# Patient Record
Sex: FEMALE | Race: WHITE | Employment: OTHER | URBAN - METROPOLITAN AREA
[De-identification: names, ages, dates, MRNs, and addresses within clinical notes are randomized per-mention and may not be internally consistent; named-entity substitution may affect disease eponyms.]

---

## 2017-07-19 ENCOUNTER — HOSPITAL ENCOUNTER (OUTPATIENT)
Dept: LAB | Age: 67
Discharge: HOME | End: 2017-07-19

## 2017-07-19 PROCEDURE — 88305 TISSUE EXAM BY PATHOLOGIST: CPT | Performed by: INTERNAL MEDICINE

## 2020-09-02 ENCOUNTER — HOSPITAL ENCOUNTER (OUTPATIENT)
Dept: LAB | Age: 70
Discharge: HOME | End: 2020-09-02

## 2020-09-02 PROCEDURE — 88305 TISSUE EXAM BY PATHOLOGIST: CPT

## 2022-01-25 ENCOUNTER — TRANSCRIBE ORDER (OUTPATIENT)
Dept: REGISTRATION | Age: 72
End: 2022-01-25

## 2022-01-25 DIAGNOSIS — K86.2 CYST OF PANCREAS: Primary | ICD-10-CM

## 2022-02-03 ENCOUNTER — HOSPITAL ENCOUNTER (OUTPATIENT)
Dept: MRI IMAGING | Age: 72
Discharge: HOME | End: 2022-02-03
Attending: INTERNAL MEDICINE
Payer: MEDICARE

## 2022-02-03 DIAGNOSIS — K86.2 CYST OF PANCREAS: ICD-10-CM

## 2022-02-03 PROCEDURE — 74011000258 HC RX REV CODE- 258: Performed by: INTERNAL MEDICINE

## 2022-02-03 PROCEDURE — 74011250636 HC RX REV CODE- 250/636: Performed by: INTERNAL MEDICINE

## 2022-02-03 PROCEDURE — 74183 MRI ABD W/O CNTR FLWD CNTR: CPT

## 2022-02-03 PROCEDURE — A9576 INJ PROHANCE MULTIPACK: HCPCS | Performed by: INTERNAL MEDICINE

## 2022-02-03 RX ORDER — SODIUM CHLORIDE 0.9 % (FLUSH) 0.9 %
10 SYRINGE (ML) INJECTION
Status: COMPLETED | OUTPATIENT
Start: 2022-02-03 | End: 2022-02-03

## 2022-02-03 RX ADMIN — GADOTERIDOL 15 ML: 279.3 INJECTION, SOLUTION INTRAVENOUS at 07:36

## 2022-02-03 RX ADMIN — SODIUM CHLORIDE 100 ML: 900 INJECTION, SOLUTION INTRAVENOUS at 07:36

## 2022-02-03 RX ADMIN — Medication 10 ML: at 07:36

## 2022-03-31 ENCOUNTER — HOSPITAL ENCOUNTER (OUTPATIENT)
Dept: LAB | Age: 72
Discharge: HOME | End: 2022-03-31

## 2022-03-31 PROCEDURE — 88305 TISSUE EXAM BY PATHOLOGIST: CPT

## 2022-03-31 PROCEDURE — 88312 SPECIAL STAINS GROUP 1: CPT

## 2023-03-07 ENCOUNTER — HOSPITAL ENCOUNTER (OUTPATIENT)
Dept: MRI IMAGING | Age: 73
Discharge: HOME | End: 2023-03-10
Payer: MEDICARE

## 2023-03-07 DIAGNOSIS — D49.0 NEOPLASM OF UNSPECIFIED BEHAVIOR OF DIGESTIVE SYSTEM: ICD-10-CM

## 2023-03-07 DIAGNOSIS — K75.81 NONALCOHOLIC STEATOHEPATITIS: ICD-10-CM

## 2023-03-07 PROCEDURE — 74181 MRI ABDOMEN W/O CONTRAST: CPT

## 2024-02-18 ENCOUNTER — APPOINTMENT (OUTPATIENT)
Dept: CT IMAGING | Age: 74
End: 2024-02-18
Payer: MEDICARE

## 2024-02-18 ENCOUNTER — HOSPITAL ENCOUNTER (EMERGENCY)
Age: 74
Discharge: HOME | End: 2024-02-18
Payer: MEDICARE

## 2024-02-18 VITALS
RESPIRATION RATE: 15 BRPM | SYSTOLIC BLOOD PRESSURE: 138 MMHG | OXYGEN SATURATION: 96 % | HEART RATE: 77 BPM | TEMPERATURE: 97.7 F | DIASTOLIC BLOOD PRESSURE: 59 MMHG

## 2024-02-18 DIAGNOSIS — R05.3 CHRONIC COUGH: Primary | ICD-10-CM

## 2024-02-18 LAB
ALBUMIN SERPL-MCNC: 2.5 G/DL (ref 3.2–4.6)
ALBUMIN/GLOB SERPL: 0.6 (ref 0.4–1.6)
ALP SERPL-CCNC: 129 U/L (ref 50–136)
ALT SERPL-CCNC: 38 U/L (ref 12–65)
ANION GAP SERPL CALC-SCNC: 6 MMOL/L (ref 2–11)
AST SERPL-CCNC: 101 U/L (ref 15–37)
BACTERIA URNS QL MICRO: NEGATIVE /HPF
BASOPHILS # BLD: 0 K/UL (ref 0–0.2)
BASOPHILS NFR BLD: 0 % (ref 0–2)
BILIRUB SERPL-MCNC: 0.9 MG/DL (ref 0.2–1.1)
BILIRUB UR QL: NEGATIVE
BUN SERPL-MCNC: 13 MG/DL (ref 8–23)
CALCIUM SERPL-MCNC: 8.9 MG/DL (ref 8.3–10.4)
CASTS URNS QL MICRO: NORMAL /LPF
CHLORIDE SERPL-SCNC: 107 MMOL/L (ref 103–113)
CO2 SERPL-SCNC: 28 MMOL/L (ref 21–32)
CREAT SERPL-MCNC: 1 MG/DL (ref 0.6–1)
DIFFERENTIAL METHOD BLD: ABNORMAL
EOSINOPHIL # BLD: 0 K/UL (ref 0–0.8)
EOSINOPHIL NFR BLD: 0 % (ref 0.5–7.8)
EPI CELLS #/AREA URNS HPF: NORMAL /HPF
ERYTHROCYTE [DISTWIDTH] IN BLOOD BY AUTOMATED COUNT: 16.9 % (ref 11.9–14.6)
GLOBULIN SER CALC-MCNC: 4.5 G/DL (ref 2.8–4.5)
GLUCOSE BLD STRIP.AUTO-MCNC: 105 MG/DL (ref 65–100)
GLUCOSE SERPL-MCNC: 124 MG/DL (ref 65–100)
GLUCOSE UR QL STRIP.AUTO: NEGATIVE MG/DL
HCT VFR BLD AUTO: 35.5 % (ref 35.8–46.3)
HGB BLD-MCNC: 11.7 G/DL (ref 11.7–15.4)
IMM GRANULOCYTES # BLD AUTO: 0 K/UL (ref 0–0.5)
IMM GRANULOCYTES NFR BLD AUTO: 0 % (ref 0–5)
KETONES UR-MCNC: ABNORMAL MG/DL
LEUKOCYTE ESTERASE UR QL STRIP: NEGATIVE
LYMPHOCYTES # BLD: 1.3 K/UL (ref 0.5–4.6)
LYMPHOCYTES NFR BLD: 52 % (ref 13–44)
MCH RBC QN AUTO: 30.4 PG (ref 26.1–32.9)
MCHC RBC AUTO-ENTMCNC: 33 G/DL (ref 31.4–35)
MCV RBC AUTO: 92.2 FL (ref 82–102)
MONOCYTES # BLD: 0.2 K/UL (ref 0.1–1.3)
MONOCYTES NFR BLD: 9 % (ref 4–12)
NEUTS SEG # BLD: 0.9 K/UL (ref 1.7–8.2)
NEUTS SEG NFR BLD: 38 % (ref 43–78)
NITRITE UR QL: NEGATIVE
NRBC # BLD: 0 K/UL (ref 0–0.2)
PH UR: 7 (ref 5–9)
PLATELET # BLD AUTO: 116 K/UL (ref 150–450)
PMV BLD AUTO: 10.4 FL (ref 9.4–12.3)
POTASSIUM SERPL-SCNC: 3.8 MMOL/L (ref 3.5–5.1)
PROCALCITONIN SERPL-MCNC: 0.1 NG/ML (ref 0–0.49)
PROT SERPL-MCNC: 7 G/DL (ref 6.3–8.2)
PROT UR QL: NEGATIVE MG/DL
RBC # BLD AUTO: 3.85 M/UL (ref 4.05–5.2)
RBC # UR STRIP: NEGATIVE
RBC #/AREA URNS HPF: NORMAL /HPF
SERVICE CMNT-IMP: ABNORMAL
SERVICE CMNT-IMP: ABNORMAL
SODIUM SERPL-SCNC: 141 MMOL/L (ref 136–146)
SP GR UR: 1.02 (ref 1–1.02)
UROBILINOGEN UR QL: 2 EU/DL (ref 0.2–1)
WBC # BLD AUTO: 2.4 K/UL (ref 4.3–11.1)
WBC URNS QL MICRO: NORMAL /HPF

## 2024-02-18 PROCEDURE — 2580000003 HC RX 258: Performed by: PHYSICIAN ASSISTANT

## 2024-02-18 PROCEDURE — 6360000004 HC RX CONTRAST MEDICATION: Performed by: PHYSICIAN ASSISTANT

## 2024-02-18 PROCEDURE — 81015 MICROSCOPIC EXAM OF URINE: CPT

## 2024-02-18 PROCEDURE — 85025 COMPLETE CBC W/AUTO DIFF WBC: CPT

## 2024-02-18 PROCEDURE — 81001 URINALYSIS AUTO W/SCOPE: CPT

## 2024-02-18 PROCEDURE — 71260 CT THORAX DX C+: CPT

## 2024-02-18 PROCEDURE — 80053 COMPREHEN METABOLIC PANEL: CPT

## 2024-02-18 PROCEDURE — 84145 PROCALCITONIN (PCT): CPT

## 2024-02-18 PROCEDURE — 99285 EMERGENCY DEPT VISIT HI MDM: CPT

## 2024-02-18 PROCEDURE — 81003 URINALYSIS AUTO W/O SCOPE: CPT

## 2024-02-18 PROCEDURE — 82962 GLUCOSE BLOOD TEST: CPT

## 2024-02-18 RX ORDER — GUAIFENESIN 600 MG/1
600 TABLET, EXTENDED RELEASE ORAL 2 TIMES DAILY
Qty: 14 TABLET | Refills: 0 | Status: SHIPPED | OUTPATIENT
Start: 2024-02-18 | End: 2024-02-25

## 2024-02-18 RX ORDER — PREDNISONE 20 MG/1
20 TABLET ORAL DAILY
Qty: 5 TABLET | Refills: 0 | Status: SHIPPED | OUTPATIENT
Start: 2024-02-18 | End: 2024-02-23

## 2024-02-18 RX ORDER — 0.9 % SODIUM CHLORIDE 0.9 %
500 INTRAVENOUS SOLUTION INTRAVENOUS
Status: COMPLETED | OUTPATIENT
Start: 2024-02-18 | End: 2024-02-18

## 2024-02-18 RX ADMIN — SODIUM CHLORIDE 500 ML: 9 INJECTION, SOLUTION INTRAVENOUS at 16:57

## 2024-02-18 RX ADMIN — IOPAMIDOL 100 ML: 755 INJECTION, SOLUTION INTRAVENOUS at 16:54

## 2024-02-18 ASSESSMENT — LIFESTYLE VARIABLES
HOW MANY STANDARD DRINKS CONTAINING ALCOHOL DO YOU HAVE ON A TYPICAL DAY: PATIENT DOES NOT DRINK
HOW OFTEN DO YOU HAVE A DRINK CONTAINING ALCOHOL: NEVER

## 2024-02-18 NOTE — ED NOTES
I have reviewed discharge instructions with the patient.  The patient verbalized understanding.    Patient left ED via Discharge Method: ambulatory to Home with son.    Opportunity for questions and clarification provided.       Patient given 2 scripts.         To continue your aftercare when you leave the hospital, you may receive an automated call from our care team to check in on how you are doing.  This is a free service and part of our promise to provide the best care and service to meet your aftercare needs.” If you have questions, or wish to unsubscribe from this service please call [phone].  Thank you for Choosing our [facility] Emergency Department.        Norah Dsouza, RN  02/18/24 7306

## 2024-02-18 NOTE — ED TRIAGE NOTES
Pt arrives to the ER with c/o cough and intermittent shob x 3 months. Pt states hx diabetes with fluctuating blood sugar levels as well with a 113 bgl in triage.

## 2024-02-18 NOTE — ED PROVIDER NOTES
Emergency Department Provider Note       PCP: Demetrius Almodovar MD   Age: 73 y.o.   Sex: female     DISPOSITION Discharge - Pending Orders Complete 02/18/2024 06:01:47 PM       ICD-10-CM    1. Chronic cough  R05.3 [facility]          Medical Decision Making     Complexity of Problems Addressed:  Complexity of Problem: 1 acute, uncomplicated illness or injury.    Data Reviewed and Analyzed:  I independently ordered and reviewed each unique test.  I reviewed external records: provider visit note from PCP.   The patients assessment required an independent historian: son.  The reason they were needed is important historical information not provided by the patient.    I interpreted the CT Scan. I reviewed images and radiology report    Discussion of management or test interpretation.  72 yo female presenting for chronic cough, no improvement with antibiotics or cough suppressants. She is not SOB, her vitals are stable here today, no hypoxia. She has no increased work of breathing. She is coughing. Labs checked today with mild leukopenia, thrombocytopenia, lymphocytosis (viral illness?). CT chest ordered for better visualization of lung tissue, reticular nodular densities in the right upper and middle lobe with DDX inflammatory process vs. Aspiration pneumonia vs. Endobronchial infection suggested by radiologist. Patient has no hx of aspiration, I favor more the inflammatory process as she has completed multiple rounds of antibiotics with no change in symptoms and with reassuring labs, no fevers, less likely bacterial and at increased risk of side effects from overuse. Will treat with low dose prednisone as patient states BG has actually been running on lower end of late, monitor closely while on prednisone due to risk of hyperglycemia. Continue albuterol, mucinex to thin secretions. Referral placed  to pulmonology. Return precautions discussed.        Risk of Complications and/or

## 2024-02-18 NOTE — DISCHARGE INSTRUCTIONS
Follow up with pulmonology as discussed. Use medications as prescribed, prednisone can raise your blood sugar so make sure you are watching this closely at home. Use the albuterol inhaler as needed as well.    Follow-up with your PCP in 1 week if no improvement.  Return to the ER for any new or worsening symptoms.

## 2024-02-27 ENCOUNTER — OFFICE VISIT (OUTPATIENT)
Dept: PULMONOLOGY | Age: 74
End: 2024-02-27
Payer: MEDICARE

## 2024-02-27 VITALS
WEIGHT: 149.3 LBS | HEIGHT: 59 IN | SYSTOLIC BLOOD PRESSURE: 150 MMHG | BODY MASS INDEX: 30.1 KG/M2 | HEART RATE: 96 BPM | DIASTOLIC BLOOD PRESSURE: 60 MMHG | OXYGEN SATURATION: 97 %

## 2024-02-27 DIAGNOSIS — R05.2 SUBACUTE COUGH: Primary | ICD-10-CM

## 2024-02-27 DIAGNOSIS — K21.9 GASTROESOPHAGEAL REFLUX DISEASE, UNSPECIFIED WHETHER ESOPHAGITIS PRESENT: ICD-10-CM

## 2024-02-27 LAB
EXPIRATORY TIME: NORMAL
FEF 25-75% %PRED-PRE: NORMAL
FEF 25-75% PRED: NORMAL
FEF 25-75-PRE: NORMAL
FEV1 %PRED-PRE: 88 %
FEV1 PRED: 1.78 L
FEV1/FVC %PRED-PRE: 94 %
FEV1/FVC PRED: 76 %
FEV1/FVC: 71 %
FEV1: 1.56 L
FVC %PRED-PRE: 93 %
FVC PRED: 2.37 L
FVC: 2.2 L
PEF %PRED-PRE: NORMAL
PEF PRED: NORMAL
PEF-PRE: NORMAL

## 2024-02-27 PROCEDURE — 3078F DIAST BP <80 MM HG: CPT | Performed by: STUDENT IN AN ORGANIZED HEALTH CARE EDUCATION/TRAINING PROGRAM

## 2024-02-27 PROCEDURE — 99204 OFFICE O/P NEW MOD 45 MIN: CPT | Performed by: STUDENT IN AN ORGANIZED HEALTH CARE EDUCATION/TRAINING PROGRAM

## 2024-02-27 PROCEDURE — G8484 FLU IMMUNIZE NO ADMIN: HCPCS | Performed by: STUDENT IN AN ORGANIZED HEALTH CARE EDUCATION/TRAINING PROGRAM

## 2024-02-27 PROCEDURE — G8417 CALC BMI ABV UP PARAM F/U: HCPCS | Performed by: STUDENT IN AN ORGANIZED HEALTH CARE EDUCATION/TRAINING PROGRAM

## 2024-02-27 PROCEDURE — 1090F PRES/ABSN URINE INCON ASSESS: CPT | Performed by: STUDENT IN AN ORGANIZED HEALTH CARE EDUCATION/TRAINING PROGRAM

## 2024-02-27 PROCEDURE — 3077F SYST BP >= 140 MM HG: CPT | Performed by: STUDENT IN AN ORGANIZED HEALTH CARE EDUCATION/TRAINING PROGRAM

## 2024-02-27 PROCEDURE — G8427 DOCREV CUR MEDS BY ELIG CLIN: HCPCS | Performed by: STUDENT IN AN ORGANIZED HEALTH CARE EDUCATION/TRAINING PROGRAM

## 2024-02-27 PROCEDURE — 1036F TOBACCO NON-USER: CPT | Performed by: STUDENT IN AN ORGANIZED HEALTH CARE EDUCATION/TRAINING PROGRAM

## 2024-02-27 PROCEDURE — 3017F COLORECTAL CA SCREEN DOC REV: CPT | Performed by: STUDENT IN AN ORGANIZED HEALTH CARE EDUCATION/TRAINING PROGRAM

## 2024-02-27 PROCEDURE — G8399 PT W/DXA RESULTS DOCUMENT: HCPCS | Performed by: STUDENT IN AN ORGANIZED HEALTH CARE EDUCATION/TRAINING PROGRAM

## 2024-02-27 PROCEDURE — 1123F ACP DISCUSS/DSCN MKR DOCD: CPT | Performed by: STUDENT IN AN ORGANIZED HEALTH CARE EDUCATION/TRAINING PROGRAM

## 2024-02-27 RX ORDER — OMEPRAZOLE 40 MG/1
40 CAPSULE, DELAYED RELEASE ORAL DAILY
COMMUNITY
Start: 2023-11-29 | End: 2024-11-28

## 2024-02-27 RX ORDER — METFORMIN HYDROCHLORIDE 500 MG/1
500 TABLET, EXTENDED RELEASE ORAL
COMMUNITY
Start: 2016-06-02 | End: 2024-05-10

## 2024-02-27 RX ORDER — GLIPIZIDE 10 MG/1
10 TABLET ORAL 2 TIMES DAILY
COMMUNITY

## 2024-02-27 RX ORDER — POTASSIUM CHLORIDE 20 MEQ/1
20 TABLET, EXTENDED RELEASE ORAL DAILY
COMMUNITY
Start: 2023-08-21 | End: 2024-08-20

## 2024-02-27 RX ORDER — PIOGLITAZONEHYDROCHLORIDE 45 MG/1
45 TABLET ORAL DAILY
COMMUNITY
Start: 2023-02-02

## 2024-02-27 RX ORDER — ASPIRIN 81 MG/1
81 TABLET ORAL DAILY
COMMUNITY

## 2024-02-27 RX ORDER — ONDANSETRON 4 MG/1
4 TABLET, FILM COATED ORAL EVERY 8 HOURS PRN
COMMUNITY
Start: 2021-04-17

## 2024-02-27 RX ORDER — LEVOTHYROXINE SODIUM 112 UG/1
TABLET ORAL
COMMUNITY

## 2024-02-27 RX ORDER — FLUTICASONE PROPIONATE 50 MCG
2 SPRAY, SUSPENSION (ML) NASAL DAILY
COMMUNITY
Start: 2023-05-11 | End: 2024-05-10

## 2024-02-27 RX ORDER — ALBUTEROL SULFATE 90 UG/1
2 AEROSOL, METERED RESPIRATORY (INHALATION) EVERY 6 HOURS PRN
COMMUNITY
Start: 2024-01-26

## 2024-02-27 RX ORDER — HYDROCHLOROTHIAZIDE 25 MG/1
25 TABLET ORAL DAILY
COMMUNITY
Start: 2023-05-11 | End: 2024-05-10

## 2024-02-27 RX ORDER — LOSARTAN POTASSIUM 100 MG/1
100 TABLET ORAL DAILY
COMMUNITY
Start: 2016-04-04 | End: 2024-05-10

## 2024-02-27 RX ORDER — VITAMIN E 268 MG
CAPSULE ORAL
COMMUNITY

## 2024-02-27 RX ORDER — ERGOCALCIFEROL 1.25 MG/1
50000 CAPSULE ORAL
COMMUNITY
Start: 2016-06-02 | End: 2024-05-10

## 2024-02-27 RX ORDER — PANTOPRAZOLE SODIUM 40 MG/1
40 TABLET, DELAYED RELEASE ORAL
Qty: 90 TABLET | Refills: 3 | Status: SHIPPED | OUTPATIENT
Start: 2024-02-27

## 2024-02-27 ASSESSMENT — PULMONARY FUNCTION TESTS
FEV1_PERCENT_PREDICTED_PRE: 88
FEV1: 1.56
FEV1/FVC_PERCENT_PREDICTED_PRE: 94
FVC: 2.20
FVC_PREDICTED: 2.37
FVC_PERCENT_PREDICTED_PRE: 93
FEV1/FVC: 71
FEV1_PREDICTED: 1.78
FEV1/FVC_PREDICTED: 76

## 2024-02-27 NOTE — PROGRESS NOTES
Name:  Poonam Luciano  YOB: 1950   MRN: [id number]      Office Visit: 2/28/2024        ASSESSMENT AND PLAN:  (Medical Decision Making)    Impression: 73 y.o. female here as a new patient referred by ER PA after presenting to the ER with chronic cough x 3 months.     1. Subacute cough  Appearance of CT chest could possibly be MAC infection especially given her symptoms of unusual weight loss, fatigue, cough, and shortness of breath.  However aspiration due to uncontrolled GERD is also a possibility.  She has completed several rounds of antibiotics and prednisone which have not improved symptoms.  Spirometry was normal in office today.  Recommended she continue 1200 mg Mucinex twice daily to clear secretions and provide sputum culture and we will check AFB cultures from this to rule out MAC infection. Will also optimize GERD treatment with 40mg omeprazole at night which is already at her pharmacy for pickup as well as 40mg protonix in the morning.  Will keep short-term follow-up for now.    - Spirometry Without Bronchodilator  - AFB Culture + Smear W/Rflx ID From Culture; Future  - Culture, Respiratory; Future  - pantoprazole (PROTONIX) 40 MG tablet; Take 1 tablet by mouth every morning (before breakfast)  Dispense: 90 tablet; Refill: 3    2. Gastroesophageal reflux disease, unspecified whether esophagitis present  Will optimize GERD treatment with 40 mg protonix in the morning and 40mg omeprazole nightly (prescription given by another provider.)  Encouraged her not to lie flat or recline after eating.    - pantoprazole (PROTONIX) 40 MG tablet; Take 1 tablet by mouth every morning (before breakfast)  Dispense: 90 tablet; Refill: 3    Orders Placed This Encounter   Medications    pantoprazole (PROTONIX) 40 MG tablet     Sig: Take 1 tablet by mouth every morning (before breakfast)     Dispense:  90 tablet     Refill:  3     No orders of the defined types were placed in this

## 2024-02-27 NOTE — PATIENT INSTRUCTIONS
We will start checking on causes for your chronic cough.     - we need to start with treating acid reflux. Take omeprazole 40mg nightly and 40mg protonix in the morning 30 mins to 1 hour before eating. Do not lay down or recline immediately after eating.   - Take plain Mucinex (guaifenesin) 1200 mg  twice a day with a full glass of water to help break up secretions.    - Try to cough up sputum in the cup we provided you today. If you cough up something and you can't get to our lab right away put sample in the refrigerator and bring it to us as soon as you can.

## 2024-02-28 PROBLEM — K75.81 NASH (NONALCOHOLIC STEATOHEPATITIS): Status: ACTIVE | Noted: 2020-12-02

## 2024-02-28 PROBLEM — R05.2 SUBACUTE COUGH: Status: ACTIVE | Noted: 2024-02-28

## 2024-02-28 PROBLEM — K21.9 GERD (GASTROESOPHAGEAL REFLUX DISEASE): Chronic | Status: ACTIVE | Noted: 2024-02-28

## 2024-02-28 PROBLEM — K57.30 DIVERTICULOSIS OF LARGE INTESTINE WITHOUT PERFORATION OR ABSCESS WITHOUT BLEEDING: Status: ACTIVE | Noted: 2023-05-30

## 2024-02-28 PROBLEM — E78.2 MIXED HYPERLIPIDEMIA: Status: ACTIVE | Noted: 2017-05-26

## 2024-03-08 DIAGNOSIS — R05.2 SUBACUTE COUGH: Primary | ICD-10-CM

## 2024-03-08 LAB
BACTERIA SPEC CULT: NORMAL
GRAM STN SPEC: NORMAL
SERVICE CMNT-IMP: NORMAL

## 2024-03-09 LAB
ACID FAST STN SPEC: NEGATIVE
SPECIMEN PREPARATION: NORMAL
SPECIMEN SOURCE: NORMAL

## 2024-03-11 DIAGNOSIS — R05.2 SUBACUTE COUGH: ICD-10-CM

## 2024-03-11 DIAGNOSIS — R05.2 SUBACUTE COUGH: Primary | ICD-10-CM

## 2024-03-19 ENCOUNTER — TELEPHONE (OUTPATIENT)
Dept: PULMONOLOGY | Age: 74
End: 2024-03-19

## 2024-03-19 NOTE — TELEPHONE ENCOUNTER
Spoke to patient and let her know that her sputum culture wasn't adequate enough to run in the lab. she states she is not coughing up a whole lot now and she is unable to come this far to  new specimen cup to resubmit for culture. She wants to know what is next step since she cannot do sputum culture?

## 2024-03-19 NOTE — TELEPHONE ENCOUNTER
----- Message from ARLET Gomez NP sent at 3/8/2024  2:07 PM EST -----  Will you please call Ms. Luciano and let her know that her sputum culture wasn't adequate enough to run in the lab? If she is still coughing up secretions, she can take another sample to the lab. I will place order for repeat sputum culture/AFB.   Thank you  ARLET Gomez NP

## 2024-03-20 NOTE — TELEPHONE ENCOUNTER
Dipika Lanier APRN - Zaynab Lynch MA  Caller: Unspecified (Yesterday,  3:08 PM)  It is okay if she cannot provide sputum culture at this time but I would like to see if she is still forcefully coughing like she was before or if this is better. Is she taking the protonix and pepcid and does she feel like her acid reflux is better? If the cough is better we will reassess on follow up.    Thank you,  ARLET Gomez NP      Spoke to patient and she states she is much better. Her cough is nothing like it was and still taking the protonix and pepcid and the reflux is much better. Informed her will reassess the cough at follow up visit but if she starts having any worsening symptoms to call the office and she voices understanding.

## 2024-04-21 LAB
ACID FAST STN SPEC: NEGATIVE
MYCOBACTERIUM SPEC QL CULT: NEGATIVE
SPECIMEN PREPARATION: NORMAL
SPECIMEN SOURCE: NORMAL

## 2024-04-23 ENCOUNTER — OFFICE VISIT (OUTPATIENT)
Dept: PULMONOLOGY | Age: 74
End: 2024-04-23
Payer: MEDICARE

## 2024-04-23 VITALS
TEMPERATURE: 98 F | HEIGHT: 59 IN | HEART RATE: 89 BPM | RESPIRATION RATE: 18 BRPM | SYSTOLIC BLOOD PRESSURE: 138 MMHG | OXYGEN SATURATION: 100 % | BODY MASS INDEX: 30.04 KG/M2 | DIASTOLIC BLOOD PRESSURE: 60 MMHG | WEIGHT: 149 LBS

## 2024-04-23 DIAGNOSIS — R93.89 ABNORMAL CT OF THE CHEST: ICD-10-CM

## 2024-04-23 DIAGNOSIS — R05.2 SUBACUTE COUGH: Primary | ICD-10-CM

## 2024-04-23 DIAGNOSIS — K21.9 GASTROESOPHAGEAL REFLUX DISEASE, UNSPECIFIED WHETHER ESOPHAGITIS PRESENT: ICD-10-CM

## 2024-04-23 PROCEDURE — G8417 CALC BMI ABV UP PARAM F/U: HCPCS | Performed by: STUDENT IN AN ORGANIZED HEALTH CARE EDUCATION/TRAINING PROGRAM

## 2024-04-23 PROCEDURE — 3017F COLORECTAL CA SCREEN DOC REV: CPT | Performed by: STUDENT IN AN ORGANIZED HEALTH CARE EDUCATION/TRAINING PROGRAM

## 2024-04-23 PROCEDURE — 1036F TOBACCO NON-USER: CPT | Performed by: STUDENT IN AN ORGANIZED HEALTH CARE EDUCATION/TRAINING PROGRAM

## 2024-04-23 PROCEDURE — 3075F SYST BP GE 130 - 139MM HG: CPT | Performed by: STUDENT IN AN ORGANIZED HEALTH CARE EDUCATION/TRAINING PROGRAM

## 2024-04-23 PROCEDURE — G8399 PT W/DXA RESULTS DOCUMENT: HCPCS | Performed by: STUDENT IN AN ORGANIZED HEALTH CARE EDUCATION/TRAINING PROGRAM

## 2024-04-23 PROCEDURE — 99214 OFFICE O/P EST MOD 30 MIN: CPT | Performed by: STUDENT IN AN ORGANIZED HEALTH CARE EDUCATION/TRAINING PROGRAM

## 2024-04-23 PROCEDURE — 3078F DIAST BP <80 MM HG: CPT | Performed by: STUDENT IN AN ORGANIZED HEALTH CARE EDUCATION/TRAINING PROGRAM

## 2024-04-23 PROCEDURE — 1123F ACP DISCUSS/DSCN MKR DOCD: CPT | Performed by: STUDENT IN AN ORGANIZED HEALTH CARE EDUCATION/TRAINING PROGRAM

## 2024-04-23 PROCEDURE — 1090F PRES/ABSN URINE INCON ASSESS: CPT | Performed by: STUDENT IN AN ORGANIZED HEALTH CARE EDUCATION/TRAINING PROGRAM

## 2024-04-23 PROCEDURE — G8427 DOCREV CUR MEDS BY ELIG CLIN: HCPCS | Performed by: STUDENT IN AN ORGANIZED HEALTH CARE EDUCATION/TRAINING PROGRAM

## 2024-04-23 RX ORDER — OMEPRAZOLE 40 MG/1
40 CAPSULE, DELAYED RELEASE ORAL DAILY
Qty: 30 CAPSULE | Refills: 11 | Status: SHIPPED | OUTPATIENT
Start: 2024-04-23 | End: 2025-04-23

## 2024-04-23 NOTE — PROGRESS NOTES
(ERGOCALCIFEROL) 50,000 Units, Oral, EVERY 7 DAYS    vitamin E 180 MG (400 UNIT) CAPS capsule Oral

## 2024-04-23 NOTE — PATIENT INSTRUCTIONS
You can get your CT of the chest done at [facility] locations   Please get your CT chest done around July.     [facility] ([place]) Rad Department Scheduling  [phone]  Locations:  [facility], [facility] or [facility]    When your CT has been done, we recommend you call us if you haven't heard about your results within a week.

## 2024-07-16 ENCOUNTER — NURSE ONLY (OUTPATIENT)
Dept: PULMONOLOGY | Age: 74
End: 2024-07-16
Payer: MEDICARE

## 2024-07-16 ENCOUNTER — HOSPITAL ENCOUNTER (OUTPATIENT)
Dept: CT IMAGING | Age: 74
Discharge: HOME | End: 2024-07-19
Payer: MEDICARE

## 2024-07-16 DIAGNOSIS — R05.2 SUBACUTE COUGH: ICD-10-CM

## 2024-07-16 DIAGNOSIS — R05.2 SUBACUTE COUGH: Primary | ICD-10-CM

## 2024-07-16 LAB
FEV 1 , POC: 1.3 L
FEV1 % PRED, POC: 76 %
FEV1/FVC, POC: NORMAL
FVC % PRED, POC: 83 %
FVC, POC: NORMAL

## 2024-07-16 PROCEDURE — 71250 CT THORAX DX C-: CPT

## 2024-07-16 PROCEDURE — 94729 DIFFUSING CAPACITY: CPT | Performed by: INTERNAL MEDICINE

## 2024-07-16 PROCEDURE — 94726 PLETHYSMOGRAPHY LUNG VOLUMES: CPT | Performed by: INTERNAL MEDICINE

## 2024-07-16 PROCEDURE — 94060 EVALUATION OF WHEEZING: CPT | Performed by: INTERNAL MEDICINE

## 2024-07-16 ASSESSMENT — PULMONARY FUNCTION TESTS
FVC_PERCENT_PREDICTED_POC: 83
FEV1_PERCENT_PREDICTED_POC: 76

## 2024-08-06 ENCOUNTER — TELEPHONE (OUTPATIENT)
Dept: PULMONOLOGY | Age: 74
End: 2024-08-06

## 2024-08-06 NOTE — TELEPHONE ENCOUNTER
----- Message from ARLET Gomez NP sent at 7/24/2024 11:18 AM EDT -----  Will you please notify Ms Luciano that I have reviewed the results of her CT scan and breathing tests.   \"Overall the CT chest appears better. There is a small amount of fluid around the right lung that was not present in February. We will keep an eye on this to make sure it is not getting larger or causing any drops in oxygen saturation or shortness of breath. We will check a chest x-ray before your August visit and will discuss results in full detail. Last visit you reported some improvement in the cough. Is the cough still getting better or is it gone? Please call/[place] message with any questions or concerns.\"    ARLET Gomez NP

## 2024-08-13 ENCOUNTER — TELEPHONE (OUTPATIENT)
Dept: PULMONOLOGY | Age: 74
End: 2024-08-13

## 2024-08-13 NOTE — TELEPHONE ENCOUNTER
----- Message from ARLET Raymundo NP sent at 7/24/2024 11:18 AM EDT -----  Will you please notify Ms Luciano that I have reviewed the results of her CT scan and breathing tests.   \"Overall the CT chest appears better. There is a small amount of fluid around the right lung that was not present in February. We will keep an eye on this to make sure it is not getting larger or causing any drops in oxygen saturation or shortness of breath. We will check a chest x-ray before your August visit and will discuss results in full detail. Last visit you reported some improvement in the cough. Is the cough still getting better or is it gone? Please call/[facility] message with any questions or concerns.\"    ARLET Gomez NP

## 2024-08-21 ENCOUNTER — OFFICE VISIT (OUTPATIENT)
Dept: PULMONOLOGY | Age: 74
End: 2024-08-21
Payer: MEDICARE

## 2024-08-21 VITALS
TEMPERATURE: 98.4 F | HEIGHT: 59 IN | BODY MASS INDEX: 31.25 KG/M2 | HEART RATE: 85 BPM | WEIGHT: 155 LBS | DIASTOLIC BLOOD PRESSURE: 56 MMHG | RESPIRATION RATE: 18 BRPM | OXYGEN SATURATION: 99 % | SYSTOLIC BLOOD PRESSURE: 122 MMHG

## 2024-08-21 DIAGNOSIS — J90 PLEURAL EFFUSION: ICD-10-CM

## 2024-08-21 DIAGNOSIS — R93.89 ABNORMAL CT OF THE CHEST: Primary | ICD-10-CM

## 2024-08-21 DIAGNOSIS — R05.3 CHRONIC COUGH: ICD-10-CM

## 2024-08-21 PROCEDURE — 99214 OFFICE O/P EST MOD 30 MIN: CPT | Performed by: STUDENT IN AN ORGANIZED HEALTH CARE EDUCATION/TRAINING PROGRAM

## 2024-08-21 PROCEDURE — G8427 DOCREV CUR MEDS BY ELIG CLIN: HCPCS | Performed by: STUDENT IN AN ORGANIZED HEALTH CARE EDUCATION/TRAINING PROGRAM

## 2024-08-21 PROCEDURE — 1090F PRES/ABSN URINE INCON ASSESS: CPT | Performed by: STUDENT IN AN ORGANIZED HEALTH CARE EDUCATION/TRAINING PROGRAM

## 2024-08-21 PROCEDURE — 3017F COLORECTAL CA SCREEN DOC REV: CPT | Performed by: STUDENT IN AN ORGANIZED HEALTH CARE EDUCATION/TRAINING PROGRAM

## 2024-08-21 PROCEDURE — G8399 PT W/DXA RESULTS DOCUMENT: HCPCS | Performed by: STUDENT IN AN ORGANIZED HEALTH CARE EDUCATION/TRAINING PROGRAM

## 2024-08-21 PROCEDURE — 3078F DIAST BP <80 MM HG: CPT | Performed by: STUDENT IN AN ORGANIZED HEALTH CARE EDUCATION/TRAINING PROGRAM

## 2024-08-21 PROCEDURE — G8417 CALC BMI ABV UP PARAM F/U: HCPCS | Performed by: STUDENT IN AN ORGANIZED HEALTH CARE EDUCATION/TRAINING PROGRAM

## 2024-08-21 PROCEDURE — 1123F ACP DISCUSS/DSCN MKR DOCD: CPT | Performed by: STUDENT IN AN ORGANIZED HEALTH CARE EDUCATION/TRAINING PROGRAM

## 2024-08-21 PROCEDURE — 3074F SYST BP LT 130 MM HG: CPT | Performed by: STUDENT IN AN ORGANIZED HEALTH CARE EDUCATION/TRAINING PROGRAM

## 2024-08-21 PROCEDURE — 1036F TOBACCO NON-USER: CPT | Performed by: STUDENT IN AN ORGANIZED HEALTH CARE EDUCATION/TRAINING PROGRAM

## 2024-08-21 RX ORDER — GUAIFENESIN 600 MG/1
1200 TABLET, EXTENDED RELEASE ORAL 2 TIMES DAILY
COMMUNITY

## 2024-08-21 NOTE — PROGRESS NOTES
FLUAD, (age 65 y+), IM, Quadv, 0.5mL 11/29/2023    Influenza, FLUARIX, FLULAVAL, FLUZONE, (age 6 mo+), AFLURIA, (age 3 y+), IM, Trivalent PF, 0.5mL 09/26/2018, 10/30/2019, 09/15/2021, 10/27/2022    Influenza, FLUZONE High Dose, (age 65 y+), IM, Trivalent PF, 0.5mL 11/23/2020    Pneumococcal, PCV-13, PREVNAR 13, (age 6w+), IM, 0.5mL 10/12/2017    Pneumococcal, PPSV23, PNEUMOVAX 23, (age 2y+), SC/IM, 0.5mL 09/26/2018    TDaP, ADACEL (age 10y-64y), BOOSTRIX (age 10y+), IM, 0.5mL 01/23/2013, 06/19/2023     No past medical history on file.     Tobacco Use      Smoking status: Never        Passive exposure: Past      Smokeless tobacco: Never    Allergies   Allergen Reactions    Statins Myalgia     Other reaction(s): Myalgia    Sulfa Antibiotics      Current Outpatient Medications   Medication Instructions    albuterol sulfate HFA (PROVENTIL;VENTOLIN;PROAIR) 108 (90 Base) MCG/ACT inhaler 2 puffs, Inhalation, EVERY 6 HOURS PRN    aspirin 81 mg, DAILY    blood glucose test strips (ASCENSIA AUTODISC VI;ONE TOUCH ULTRA TEST VI) strip Test once day Dx E11.9    Ca Phosphate-Cholecalciferol (CALCIUM WITH D3 PO) Oral    fexofenadine (ALLEGRA) 30 MG/5ML suspension Oral    fluticasone (FLONASE) 50 MCG/ACT nasal spray 2 sprays, Nasal, DAILY    glipiZIDE (GLUCOTROL) 10 mg, 2 TIMES DAILY    guaiFENesin (MUCINEX) 1,200 mg, Oral, 2 TIMES DAILY    hydroCHLOROthiazide (HYDRODIURIL) 25 mg, Oral, DAILY    levothyroxine (SYNTHROID) 112 MCG tablet TAKE 1 TABLET (112 MCG TOTAL) BY MOUTH BEFORE BREAKFAST. 1 EVERY DAY    losartan (COZAAR) 100 mg, Oral, DAILY    metFORMIN (GLUCOPHAGE-XR) 500 mg, Oral    omeprazole (PRILOSEC) 40 mg, Oral, DAILY    ondansetron (ZOFRAN) 4 mg, Oral, EVERY 8 HOURS PRN    pantoprazole (PROTONIX) 40 mg, Oral, DAILY BEFORE BREAKFAST    pioglitazone (ACTOS) 45 mg, Oral, DAILY    potassium chloride (KLOR-CON M) 20 MEQ extended release tablet 20 mEq, Oral, DAILY    vitamin D (ERGOCALCIFEROL) 50,000 Units, Oral, EVERY 7 DAYS

## 2024-08-22 RX ORDER — FUROSEMIDE 20 MG/1
20 TABLET ORAL DAILY
Qty: 5 TABLET | Refills: 0 | Status: SHIPPED | OUTPATIENT
Start: 2024-08-22 | End: 2024-08-27

## 2024-09-06 DIAGNOSIS — R05.3 CHRONIC COUGH: Primary | ICD-10-CM

## 2024-09-06 DIAGNOSIS — R05.3 CHRONIC COUGH: ICD-10-CM

## 2024-09-06 DIAGNOSIS — R93.89 ABNORMAL CT OF THE CHEST: ICD-10-CM

## 2024-09-06 DIAGNOSIS — J90 PLEURAL EFFUSION: ICD-10-CM

## 2024-09-06 DIAGNOSIS — R76.8 POSITIVE ANA (ANTINUCLEAR ANTIBODY): ICD-10-CM

## 2024-09-10 ENCOUNTER — TELEPHONE (OUTPATIENT)
Dept: PULMONOLOGY | Age: 74
End: 2024-09-10

## 2024-12-04 ENCOUNTER — HOSPITAL ENCOUNTER (OUTPATIENT)
Dept: GENERAL RADIOLOGY | Age: 74
Discharge: HOME | End: 2024-12-07
Payer: MEDICARE

## 2024-12-04 ENCOUNTER — OFFICE VISIT (OUTPATIENT)
Dept: PULMONOLOGY | Age: 74
End: 2024-12-04
Payer: MEDICARE

## 2024-12-04 VITALS
WEIGHT: 152 LBS | TEMPERATURE: 98 F | HEIGHT: 60 IN | HEART RATE: 96 BPM | RESPIRATION RATE: 20 BRPM | SYSTOLIC BLOOD PRESSURE: 130 MMHG | BODY MASS INDEX: 29.84 KG/M2 | OXYGEN SATURATION: 98 % | DIASTOLIC BLOOD PRESSURE: 80 MMHG

## 2024-12-04 DIAGNOSIS — J90 PLEURAL EFFUSION: ICD-10-CM

## 2024-12-04 DIAGNOSIS — R76.8 ANA POSITIVE: ICD-10-CM

## 2024-12-04 DIAGNOSIS — R05.3 CHRONIC COUGH: Primary | ICD-10-CM

## 2024-12-04 PROCEDURE — 3017F COLORECTAL CA SCREEN DOC REV: CPT | Performed by: STUDENT IN AN ORGANIZED HEALTH CARE EDUCATION/TRAINING PROGRAM

## 2024-12-04 PROCEDURE — 1160F RVW MEDS BY RX/DR IN RCRD: CPT | Performed by: STUDENT IN AN ORGANIZED HEALTH CARE EDUCATION/TRAINING PROGRAM

## 2024-12-04 PROCEDURE — 1159F MED LIST DOCD IN RCRD: CPT | Performed by: STUDENT IN AN ORGANIZED HEALTH CARE EDUCATION/TRAINING PROGRAM

## 2024-12-04 PROCEDURE — G8427 DOCREV CUR MEDS BY ELIG CLIN: HCPCS | Performed by: STUDENT IN AN ORGANIZED HEALTH CARE EDUCATION/TRAINING PROGRAM

## 2024-12-04 PROCEDURE — 71046 X-RAY EXAM CHEST 2 VIEWS: CPT

## 2024-12-04 PROCEDURE — 76604 US EXAM CHEST: CPT | Performed by: STUDENT IN AN ORGANIZED HEALTH CARE EDUCATION/TRAINING PROGRAM

## 2024-12-04 PROCEDURE — 1123F ACP DISCUSS/DSCN MKR DOCD: CPT | Performed by: STUDENT IN AN ORGANIZED HEALTH CARE EDUCATION/TRAINING PROGRAM

## 2024-12-04 PROCEDURE — 1036F TOBACCO NON-USER: CPT | Performed by: STUDENT IN AN ORGANIZED HEALTH CARE EDUCATION/TRAINING PROGRAM

## 2024-12-04 PROCEDURE — 1126F AMNT PAIN NOTED NONE PRSNT: CPT | Performed by: STUDENT IN AN ORGANIZED HEALTH CARE EDUCATION/TRAINING PROGRAM

## 2024-12-04 PROCEDURE — G8399 PT W/DXA RESULTS DOCUMENT: HCPCS | Performed by: STUDENT IN AN ORGANIZED HEALTH CARE EDUCATION/TRAINING PROGRAM

## 2024-12-04 PROCEDURE — 3079F DIAST BP 80-89 MM HG: CPT | Performed by: STUDENT IN AN ORGANIZED HEALTH CARE EDUCATION/TRAINING PROGRAM

## 2024-12-04 PROCEDURE — G8417 CALC BMI ABV UP PARAM F/U: HCPCS | Performed by: STUDENT IN AN ORGANIZED HEALTH CARE EDUCATION/TRAINING PROGRAM

## 2024-12-04 PROCEDURE — 1090F PRES/ABSN URINE INCON ASSESS: CPT | Performed by: STUDENT IN AN ORGANIZED HEALTH CARE EDUCATION/TRAINING PROGRAM

## 2024-12-04 PROCEDURE — 99214 OFFICE O/P EST MOD 30 MIN: CPT | Performed by: STUDENT IN AN ORGANIZED HEALTH CARE EDUCATION/TRAINING PROGRAM

## 2024-12-04 PROCEDURE — 3075F SYST BP GE 130 - 139MM HG: CPT | Performed by: STUDENT IN AN ORGANIZED HEALTH CARE EDUCATION/TRAINING PROGRAM

## 2024-12-04 PROCEDURE — G8484 FLU IMMUNIZE NO ADMIN: HCPCS | Performed by: STUDENT IN AN ORGANIZED HEALTH CARE EDUCATION/TRAINING PROGRAM

## 2024-12-04 NOTE — H&P (VIEW-ONLY)
Name:  Poonam Luciano  YOB: 1950   MRN: [id number]      Office Visit: 12/4/2024        ASSESSMENT AND PLAN:  (Medical Decision Making)    Impression: 74 y.o. female here to follow up on chronic cough. She has been treated for GERD with protonix and pepcid daily. Sputum cultures were not able to be run in micro due to oral saliva contamination. CT concerning for early fibrosis and calcified lymph nodes suggestive of old granulomatous disease. Cough persists despite antibiotics, steroids, and cough suppressants. She had a small right pleural effusion seen on CT in July. She was given 5 days of lasix 20 mg. She has a history of portal hypertension, TRACY followed by [facility].       1. 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     - CT CHEST HIGH RESOLUTION; Future    2. Pleural effusion  Pleural ultrasound in office today is suggestive of significant pleural effusion on the right we will check chest x-ray today and schedule outpatient thoracentesis if needed. May also benefit from ECHO to evaluate cardiac function.     - AMB POC US, CHEST (INCLUDES MEDIASTINUM)    3. CARLITOS positive  Will be following up with rheumatology in January for isolated positive CARLITOS.     No orders of the defined types were placed in this encounter.    No orders of the defined types were placed in this encounter.      ARLET Gomez - NP    Collaborating physician is Dameon Holguin MD    __________________________________________________________    HISTORY OF PRESENT ILLNESS:    Ms. Poonam Luciano is a 74 y.o. female who is [facility] at [facility] for follow up of cough. She has a medical history of hypertension, diabetes, GERD, hypothyroid, hyperlipidemia, and TRACY.  She is a lifelong non-smoker.  No history of lung diseases. No significant

## 2024-12-04 NOTE — PROGRESS NOTES
Name:  Poonam Luciano  YOB: 1950   MRN: [id number]      Office Visit: 12/4/2024        ASSESSMENT AND PLAN:  (Medical Decision Making)    Impression: 74 y.o. female here to follow up on chronic cough. She has been treated for GERD with protonix and pepcid daily. Sputum cultures were not able to be run in micro due to oral saliva contamination. CT concerning for early fibrosis and calcified lymph nodes suggestive of old granulomatous disease. Cough persists despite antibiotics, steroids, and cough suppressants. She had a small right pleural effusion seen on CT in July. She was given 5 days of lasix 20 mg. She has a history of portal hypertension, TRACY followed by [facility].       1. 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     - CT CHEST HIGH RESOLUTION; Future    2. Pleural effusion  Pleural ultrasound in office today is suggestive of significant pleural effusion on the right we will check chest x-ray today and schedule outpatient thoracentesis if needed. May also benefit from ECHO to evaluate cardiac function.     - AMB POC US, CHEST (INCLUDES MEDIASTINUM)    3. CARLITOS positive  Will be following up with rheumatology in January for isolated positive CARLITOS.     No orders of the defined types were placed in this encounter.    No orders of the defined types were placed in this encounter.      ARLET Gomez - NP    Collaborating physician is Dameon Holguin MD    __________________________________________________________    HISTORY OF PRESENT ILLNESS:    Ms. Poonam Luciano is a 74 y.o. female who is seen at [facility] today for follow up of cough. She has a medical history of hypertension, diabetes, GERD, hypothyroid, hyperlipidemia, and TRACY.  She is a lifelong non-smoker.  No history of lung diseases. No significant

## 2024-12-05 ENCOUNTER — TELEPHONE (OUTPATIENT)
Dept: PULMONOLOGY | Age: 74
End: 2024-12-05

## 2024-12-05 ENCOUNTER — PREP FOR PROCEDURE (OUTPATIENT)
Dept: PULMONOLOGY | Age: 74
End: 2024-12-05

## 2024-12-05 DIAGNOSIS — J90 PLEURAL EFFUSION: ICD-10-CM

## 2024-12-05 NOTE — TELEPHONE ENCOUNTER
----- Message from ARLET Raymundo NP sent at 12/4/2024  4:03 PM EST -----  Will you please schedule this patient for an outpatient thoracentesis (right side) soon? I saw her in the office 12/4 with POC ultrasound and CXR confirming pleural effusion. She will need an afternoon appointment.   Thank you,   ARLET Gomez NP    I have spoken with patient.  She confirms no blood thinners. She allows me to schedule thoracentesis on 12/9 at 2 pm with Dr Galindo.  She will be at admitting as close to 1 pm as she can leaving work.  She is aware to check in at main admitting.

## 2024-12-09 ENCOUNTER — TELEPHONE (OUTPATIENT)
Dept: PULMONOLOGY | Age: 74
End: 2024-12-09

## 2024-12-09 ENCOUNTER — HOSPITAL ENCOUNTER (OUTPATIENT)
Age: 74
Discharge: HOME | End: 2024-12-09
Attending: INTERNAL MEDICINE | Admitting: INTERNAL MEDICINE
Payer: MEDICARE

## 2024-12-09 VITALS
OXYGEN SATURATION: 100 % | SYSTOLIC BLOOD PRESSURE: 136 MMHG | DIASTOLIC BLOOD PRESSURE: 60 MMHG | HEART RATE: 88 BPM | TEMPERATURE: 98 F | RESPIRATION RATE: 15 BRPM

## 2024-12-09 DIAGNOSIS — J90 PLEURAL EFFUSION: Primary | ICD-10-CM

## 2024-12-09 DIAGNOSIS — J90 PLEURAL EFFUSION: ICD-10-CM

## 2024-12-09 LAB
APPEARANCE FLD: NORMAL
COLOR FLD: YELLOW
GLUCOSE BLD STRIP.AUTO-MCNC: 100 MG/DL (ref 65–100)
GLUCOSE FLD-MCNC: 119 MG/DL
LDH FLD L TO P-CCNC: 85 U/L
LYMPHOCYTES NFR BRONCH MANUAL: 95 %
MACROPHAGES NFR BRONCH MANUAL: 5 %
NUC CELL # FLD: 724 /CU MM
PROT FLD-MCNC: 2.2 G/DL
RBC # FLD: 3000 /CU MM
SERVICE CMNT-IMP: NORMAL
SPECIMEN SOURCE FLD: NORMAL

## 2024-12-09 PROCEDURE — 84157 ASSAY OF PROTEIN OTHER: CPT

## 2024-12-09 PROCEDURE — 32555 ASPIRATE PLEURA W/ IMAGING: CPT | Performed by: INTERNAL MEDICINE

## 2024-12-09 PROCEDURE — 89050 BODY FLUID CELL COUNT: CPT

## 2024-12-09 PROCEDURE — 87205 SMEAR GRAM STAIN: CPT

## 2024-12-09 PROCEDURE — 88112 CYTOPATH CELL ENHANCE TECH: CPT

## 2024-12-09 PROCEDURE — 2709999900 HC NON-CHARGEABLE SUPPLY: Performed by: INTERNAL MEDICINE

## 2024-12-09 PROCEDURE — 88305 TISSUE EXAM BY PATHOLOGIST: CPT

## 2024-12-09 PROCEDURE — 87070 CULTURE OTHR SPECIMN AEROBIC: CPT

## 2024-12-09 PROCEDURE — C1729 CATH, DRAINAGE: HCPCS | Performed by: INTERNAL MEDICINE

## 2024-12-09 PROCEDURE — 83615 LACTATE (LD) (LDH) ENZYME: CPT

## 2024-12-09 PROCEDURE — 82962 GLUCOSE BLOOD TEST: CPT

## 2024-12-09 PROCEDURE — 82945 GLUCOSE OTHER FLUID: CPT

## 2024-12-09 PROCEDURE — 3609027000 HC THORACENTESIS W/ULTRASOUND: Performed by: INTERNAL MEDICINE

## 2024-12-09 PROCEDURE — 84311 SPECTROPHOTOMETRY: CPT

## 2024-12-09 RX ORDER — SODIUM CHLORIDE 9 MG/ML
INJECTION, SOLUTION INTRAVENOUS PRN
Status: DISCONTINUED | OUTPATIENT
Start: 2024-12-09 | End: 2024-12-09 | Stop reason: HOSPADM

## 2024-12-09 RX ORDER — SODIUM CHLORIDE 9 MG/ML
INJECTION, SOLUTION INTRAVENOUS PRN
Status: CANCELLED | OUTPATIENT
Start: 2024-12-09

## 2024-12-09 RX ORDER — SODIUM CHLORIDE 0.9 % (FLUSH) 0.9 %
5-40 SYRINGE (ML) INJECTION PRN
Status: DISCONTINUED | OUTPATIENT
Start: 2024-12-09 | End: 2024-12-09 | Stop reason: HOSPADM

## 2024-12-09 RX ORDER — SODIUM CHLORIDE 0.9 % (FLUSH) 0.9 %
5-40 SYRINGE (ML) INJECTION PRN
Status: CANCELLED | OUTPATIENT
Start: 2024-12-09

## 2024-12-09 RX ORDER — SODIUM CHLORIDE 0.9 % (FLUSH) 0.9 %
5-40 SYRINGE (ML) INJECTION EVERY 12 HOURS SCHEDULED
Status: CANCELLED | OUTPATIENT
Start: 2024-12-09

## 2024-12-09 RX ORDER — SODIUM CHLORIDE 0.9 % (FLUSH) 0.9 %
5-40 SYRINGE (ML) INJECTION EVERY 12 HOURS SCHEDULED
Status: DISCONTINUED | OUTPATIENT
Start: 2024-12-09 | End: 2024-12-09 | Stop reason: HOSPADM

## 2024-12-09 ASSESSMENT — PAIN - FUNCTIONAL ASSESSMENT: PAIN_FUNCTIONAL_ASSESSMENT: 0-10

## 2024-12-09 NOTE — OP NOTE
Operative Note      Patient: Poonam Luciano  YOB: 1950  MRN: [id number]    Date of Procedure: 12/9/2024    Pre-Op Diagnosis Codes:      * Pleural effusion [J90]    Post-Op Diagnosis: Same       Procedure(s):  THORACENTESIS ULTRASOUND in endo 2 copy to Dipika Lanier NP    Surgeon(s):  Jim Galindo Jr, MD    Assistant:   * No surgical staff found *    Anesthesia: Local    Estimated Blood Loss (mL): Minimal    Complications: None    Specimens:   ID Type Source Tests Collected by Time Destination   1 : R Pleural fluid #1 Body Fluid Thoracentesis CULTURE, BODY FLUID Jim Galindo Jr, MD 12/9/2024 1355    2 : R Pleural fluid #2 Body Fluid Thoracentesis GLUCOSE, BODY FLUID, LACTATE DEHYDROGENASE, BODY FLUID, PROTEIN, BODY FLUID, CHOLESTEROL, BODY FLUID Jim Galindo Jr, MD 12/9/2024 1355    A : R Pleural Fluid #3 Body Fluid Thoracentesis CYTOLOGY, NON-GYN Jim Galindo Jr, MD 12/9/2024 1355        Implants:  * No implants in log *      Drains: * No LDAs found *    Findings:  Infection Present At Time Of Surgery (PATOS) (choose all levels that have infection present):  No infection present      Detailed Description of Procedure:             PROCEDURE:    DIAGNOSTIC/THERAPEUTIC THORACENTESIS/PLEURAL MANNOMETRY.        PRE-OP DIAGNOSIS:    R PLEURAL EFFUSION    POST-OP DIAGNOSIS:    R PLEURAL EFFUSION    ASSISTANT:    Plumbly    ANESTHESIA:    LOCAL ANESTHESIA WITH 1% LIDOCAINE 10 CC TOTAL.      CHEST ULTRASOUND FINDINGS:    A Turbo-M, Sonosite ultrasound with a 5-16 mHz probe was used to image the chest and localize the pleural effusion on the Left/and/Right chest.    A largeanechoic space was seen on the Right consistent with an uncomplicated pleural effusion.              DESCRIPTION OF PROCEDURE:    After obtaining informed consent and localizing the safest location for thoracentesis, the  9th intercostal space was marked with a blunt, plastic needle cap in the mid scapular line.    An

## 2024-12-09 NOTE — INTERVAL H&P NOTE
Update History & Physical    The patient's History and Physical of December 4, 2024 was reviewed with the patient and I examined the patient. There was no change. The surgical site was confirmed by the patient and me.     Plan: The risks, benefits, expected outcome, and alternative to the recommended procedure have been discussed with the patient. Patient understands and wants to proceed with the procedure.     Electronically signed by Jim Galindo Jr, MD on 12/9/2024 at 1:44 PM

## 2024-12-09 NOTE — TELEPHONE ENCOUNTER
Per Dr Galindo, 3 month appointment with CXR. I have spoken with patient and she is scheduled to see Ms Lanier on 3/10 with 0720 CXR prior.

## 2024-12-09 NOTE — DISCHARGE INSTRUCTIONS
of breath that is new or getting worse.     You have new or worse pain in your chest, especially when you take a deep breath.     You are sick to your stomach or cannot keep fluids down.     You have a fever over 100°F.     Bright red blood has soaked through the bandage over your puncture site.     You have signs of infection, such as:  Increased pain, swelling, warmth, or redness.  Red streaks leading from the puncture site.  Pus draining from the puncture site.  Swollen lymph nodes in your neck, armpits, or groin.  A fever.     You cough up a lot more mucus than normal, or your mucus changes color.   Watch closely for changes in your health, and be sure to contact your doctor if you have any problems.  Where can you learn more?  Go to https://www.[facility].net/patientEd and enter Q755 to learn more about \"Thoracentesis: What to Expect at Home.\"  Current as of: August 6, 2023  Content Version: 14.2  © 2024 [employer].   Care instructions adapted under license by [employer]. If you have questions about a medical condition or this instruction, always ask your healthcare professional. [employer] disclaims any warranty or liability for your use of this information.    Keep bandage clean, dry and intact for at least 24 hours. Please call [facility] at [phone] for any questions or concerns.

## 2024-12-11 LAB
BACTERIA SPEC CULT: NORMAL
CYTOLOGY-NON GYN: NORMAL
GRAM STN SPEC: NORMAL
GRAM STN SPEC: NORMAL
SERVICE CMNT-IMP: NORMAL
SPECIMEN SOURCE: NORMAL

## 2024-12-15 LAB
CHOLESTEROL, TOTAL: 34 MG/DL
SPECIMEN SOURCE: NORMAL

## 2025-01-03 ENCOUNTER — APPOINTMENT (OUTPATIENT)
Dept: GENERAL RADIOLOGY | Age: 75
DRG: 291 | End: 2025-01-03
Payer: MEDICARE

## 2025-01-03 ENCOUNTER — HOSPITAL ENCOUNTER (INPATIENT)
Age: 75
LOS: 3 days | Discharge: HOME | DRG: 291 | End: 2025-01-06
Attending: EMERGENCY MEDICINE | Admitting: INTERNAL MEDICINE
Payer: MEDICARE

## 2025-01-03 DIAGNOSIS — J90 PLEURAL EFFUSION: Primary | ICD-10-CM

## 2025-01-03 DIAGNOSIS — K62.5 RECTAL BLEEDING: ICD-10-CM

## 2025-01-03 DIAGNOSIS — R06.09 DOE (DYSPNEA ON EXERTION): ICD-10-CM

## 2025-01-03 DIAGNOSIS — R06.02 SHORTNESS OF BREATH: ICD-10-CM

## 2025-01-03 PROBLEM — K92.2 LOWER GI BLEEDING: Status: ACTIVE | Noted: 2025-01-03

## 2025-01-03 LAB
ALBUMIN SERPL-MCNC: 2.7 G/DL (ref 3.2–4.6)
ALBUMIN/GLOB SERPL: 0.7 (ref 1–1.9)
ALP SERPL-CCNC: 120 U/L (ref 35–104)
ALT SERPL-CCNC: 24 U/L (ref 8–45)
ANION GAP SERPL CALC-SCNC: 11 MMOL/L (ref 7–16)
AST SERPL-CCNC: 46 U/L (ref 15–37)
BASOPHILS # BLD: 0 K/UL (ref 0–0.2)
BASOPHILS NFR BLD: 0 % (ref 0–2)
BILIRUB SERPL-MCNC: 0.9 MG/DL (ref 0–1.2)
BUN SERPL-MCNC: 11 MG/DL (ref 8–23)
CALCIUM SERPL-MCNC: 9 MG/DL (ref 8.8–10.2)
CHLORIDE SERPL-SCNC: 105 MMOL/L (ref 98–107)
CO2 SERPL-SCNC: 25 MMOL/L (ref 20–29)
CREAT SERPL-MCNC: 0.67 MG/DL (ref 0.6–1.1)
DIFFERENTIAL METHOD BLD: ABNORMAL
EOSINOPHIL # BLD: 0 K/UL (ref 0–0.8)
EOSINOPHIL NFR BLD: 2 % (ref 0.5–7.8)
ERYTHROCYTE [DISTWIDTH] IN BLOOD BY AUTOMATED COUNT: 17 % (ref 11.9–14.6)
GLOBULIN SER CALC-MCNC: 3.8 G/DL (ref 2.3–3.5)
GLUCOSE SERPL-MCNC: 98 MG/DL (ref 70–99)
HCT VFR BLD AUTO: 27.1 % (ref 35.8–46.3)
HGB BLD-MCNC: 8.5 G/DL (ref 11.7–15.4)
IMM GRANULOCYTES # BLD AUTO: 0 K/UL (ref 0–0.5)
IMM GRANULOCYTES NFR BLD AUTO: 0 % (ref 0–5)
LYMPHOCYTES # BLD: 0.8 K/UL (ref 0.5–4.6)
LYMPHOCYTES NFR BLD: 33 % (ref 13–44)
MAGNESIUM SERPL-MCNC: 2 MG/DL (ref 1.8–2.4)
MCH RBC QN AUTO: 27 PG (ref 26.1–32.9)
MCHC RBC AUTO-ENTMCNC: 31.4 G/DL (ref 31.4–35)
MCV RBC AUTO: 86 FL (ref 82–102)
MONOCYTES # BLD: 0.3 K/UL (ref 0.1–1.3)
MONOCYTES NFR BLD: 12 % (ref 4–12)
NEUTS SEG # BLD: 1.2 K/UL (ref 1.7–8.2)
NEUTS SEG NFR BLD: 52 % (ref 43–78)
NRBC # BLD: 0 K/UL (ref 0–0.2)
PLATELET # BLD AUTO: 158 K/UL (ref 150–450)
PMV BLD AUTO: 8.9 FL (ref 9.4–12.3)
POTASSIUM SERPL-SCNC: 3.2 MMOL/L (ref 3.5–5.1)
PROT SERPL-MCNC: 6.5 G/DL (ref 6.3–8.2)
RBC # BLD AUTO: 3.15 M/UL (ref 4.05–5.2)
SODIUM SERPL-SCNC: 141 MMOL/L (ref 136–145)
WBC # BLD AUTO: 2.3 K/UL (ref 4.3–11.1)

## 2025-01-03 PROCEDURE — 85025 COMPLETE CBC W/AUTO DIFF WBC: CPT

## 2025-01-03 PROCEDURE — 93005 ELECTROCARDIOGRAM TRACING: CPT | Performed by: NURSE PRACTITIONER

## 2025-01-03 PROCEDURE — 99285 EMERGENCY DEPT VISIT HI MDM: CPT

## 2025-01-03 PROCEDURE — 83036 HEMOGLOBIN GLYCOSYLATED A1C: CPT

## 2025-01-03 PROCEDURE — 1100000000 HC RM PRIVATE

## 2025-01-03 PROCEDURE — 71045 X-RAY EXAM CHEST 1 VIEW: CPT

## 2025-01-03 PROCEDURE — 80053 COMPREHEN METABOLIC PANEL: CPT

## 2025-01-03 PROCEDURE — 83735 ASSAY OF MAGNESIUM: CPT

## 2025-01-03 PROCEDURE — 6370000000 HC RX 637 (ALT 250 FOR IP): Performed by: NURSE PRACTITIONER

## 2025-01-03 RX ORDER — GUAIFENESIN 600 MG/1
1200 TABLET, EXTENDED RELEASE ORAL 2 TIMES DAILY
Status: DISCONTINUED | OUTPATIENT
Start: 2025-01-03 | End: 2025-01-06 | Stop reason: HOSPADM

## 2025-01-03 RX ORDER — SODIUM CHLORIDE 0.9 % (FLUSH) 0.9 %
5-40 SYRINGE (ML) INJECTION EVERY 12 HOURS SCHEDULED
Status: DISCONTINUED | OUTPATIENT
Start: 2025-01-04 | End: 2025-01-06 | Stop reason: HOSPADM

## 2025-01-03 RX ORDER — ACETAMINOPHEN 650 MG/1
650 SUPPOSITORY RECTAL EVERY 6 HOURS PRN
Status: DISCONTINUED | OUTPATIENT
Start: 2025-01-03 | End: 2025-01-06 | Stop reason: HOSPADM

## 2025-01-03 RX ORDER — POLYETHYLENE GLYCOL 3350 17 G/17G
17 POWDER, FOR SOLUTION ORAL DAILY PRN
Status: DISCONTINUED | OUTPATIENT
Start: 2025-01-03 | End: 2025-01-06 | Stop reason: HOSPADM

## 2025-01-03 RX ORDER — LEVOTHYROXINE SODIUM 100 UG/1
100 TABLET ORAL DAILY
Status: DISCONTINUED | OUTPATIENT
Start: 2025-01-04 | End: 2025-01-06 | Stop reason: HOSPADM

## 2025-01-03 RX ORDER — ONDANSETRON 4 MG/1
4 TABLET, ORALLY DISINTEGRATING ORAL EVERY 8 HOURS PRN
Status: DISCONTINUED | OUTPATIENT
Start: 2025-01-03 | End: 2025-01-03 | Stop reason: SDUPTHER

## 2025-01-03 RX ORDER — POTASSIUM CHLORIDE 1500 MG/1
40 TABLET, EXTENDED RELEASE ORAL PRN
Status: DISCONTINUED | OUTPATIENT
Start: 2025-01-03 | End: 2025-01-06 | Stop reason: HOSPADM

## 2025-01-03 RX ORDER — ONDANSETRON 4 MG/1
4 TABLET, ORALLY DISINTEGRATING ORAL EVERY 8 HOURS PRN
Status: DISCONTINUED | OUTPATIENT
Start: 2025-01-03 | End: 2025-01-06 | Stop reason: HOSPADM

## 2025-01-03 RX ORDER — ACETAMINOPHEN 325 MG/1
650 TABLET ORAL EVERY 6 HOURS PRN
Status: DISCONTINUED | OUTPATIENT
Start: 2025-01-03 | End: 2025-01-06 | Stop reason: HOSPADM

## 2025-01-03 RX ORDER — PIOGLITAZONE 15 MG/1
45 TABLET ORAL DAILY
Status: DISCONTINUED | OUTPATIENT
Start: 2025-01-04 | End: 2025-01-05

## 2025-01-03 RX ORDER — ENOXAPARIN SODIUM 100 MG/ML
40 INJECTION SUBCUTANEOUS DAILY
Status: DISCONTINUED | OUTPATIENT
Start: 2025-01-04 | End: 2025-01-04

## 2025-01-03 RX ORDER — ONDANSETRON 2 MG/ML
4 INJECTION INTRAMUSCULAR; INTRAVENOUS EVERY 6 HOURS PRN
Status: DISCONTINUED | OUTPATIENT
Start: 2025-01-03 | End: 2025-01-06 | Stop reason: HOSPADM

## 2025-01-03 RX ORDER — SODIUM CHLORIDE 9 MG/ML
INJECTION, SOLUTION INTRAVENOUS PRN
Status: DISCONTINUED | OUTPATIENT
Start: 2025-01-03 | End: 2025-01-06 | Stop reason: HOSPADM

## 2025-01-03 RX ORDER — METRONIDAZOLE 500 MG/100ML
500 INJECTION, SOLUTION INTRAVENOUS EVERY 8 HOURS
Status: DISCONTINUED | OUTPATIENT
Start: 2025-01-04 | End: 2025-01-03

## 2025-01-03 RX ORDER — METFORMIN HYDROCHLORIDE 500 MG/1
1000 TABLET, EXTENDED RELEASE ORAL
Status: DISCONTINUED | OUTPATIENT
Start: 2025-01-04 | End: 2025-01-06 | Stop reason: HOSPADM

## 2025-01-03 RX ORDER — LOSARTAN POTASSIUM 50 MG/1
100 TABLET ORAL DAILY
Status: DISCONTINUED | OUTPATIENT
Start: 2025-01-04 | End: 2025-01-05

## 2025-01-03 RX ORDER — POTASSIUM CHLORIDE 7.45 MG/ML
10 INJECTION INTRAVENOUS PRN
Status: DISCONTINUED | OUTPATIENT
Start: 2025-01-03 | End: 2025-01-06 | Stop reason: HOSPADM

## 2025-01-03 RX ORDER — VITAMIN E 268 MG
400 CAPSULE ORAL DAILY
Status: DISCONTINUED | OUTPATIENT
Start: 2025-01-04 | End: 2025-01-04 | Stop reason: RX

## 2025-01-03 RX ORDER — SODIUM CHLORIDE 9 MG/ML
INJECTION, SOLUTION INTRAVENOUS CONTINUOUS
Status: DISCONTINUED | OUTPATIENT
Start: 2025-01-04 | End: 2025-01-04

## 2025-01-03 RX ORDER — ERGOCALCIFEROL 1.25 MG/1
50000 CAPSULE, LIQUID FILLED ORAL
Status: DISCONTINUED | OUTPATIENT
Start: 2025-01-06 | End: 2025-01-06 | Stop reason: HOSPADM

## 2025-01-03 RX ORDER — ALBUTEROL SULFATE 90 UG/1
2 INHALANT RESPIRATORY (INHALATION) EVERY 6 HOURS PRN
Status: DISCONTINUED | OUTPATIENT
Start: 2025-01-03 | End: 2025-01-03 | Stop reason: ALTCHOICE

## 2025-01-03 RX ORDER — POTASSIUM CHLORIDE 1500 MG/1
20 TABLET, EXTENDED RELEASE ORAL DAILY
Status: DISCONTINUED | OUTPATIENT
Start: 2025-01-04 | End: 2025-01-06 | Stop reason: HOSPADM

## 2025-01-03 RX ORDER — POTASSIUM CHLORIDE 1500 MG/1
40 TABLET, EXTENDED RELEASE ORAL ONCE
Status: DISCONTINUED | OUTPATIENT
Start: 2025-01-03 | End: 2025-01-03

## 2025-01-03 RX ORDER — INSULIN LISPRO 100 [IU]/ML
0-10 INJECTION, SOLUTION INTRAVENOUS; SUBCUTANEOUS
Status: DISCONTINUED | OUTPATIENT
Start: 2025-01-03 | End: 2025-01-06 | Stop reason: HOSPADM

## 2025-01-03 RX ORDER — MAGNESIUM SULFATE IN WATER 40 MG/ML
2000 INJECTION, SOLUTION INTRAVENOUS PRN
Status: DISCONTINUED | OUTPATIENT
Start: 2025-01-03 | End: 2025-01-06 | Stop reason: HOSPADM

## 2025-01-03 RX ORDER — FLUTICASONE PROPIONATE 50 MCG
2 SPRAY, SUSPENSION (ML) NASAL DAILY PRN
Status: DISCONTINUED | OUTPATIENT
Start: 2025-01-03 | End: 2025-01-06 | Stop reason: HOSPADM

## 2025-01-03 RX ORDER — DEXTROSE MONOHYDRATE 100 MG/ML
INJECTION, SOLUTION INTRAVENOUS CONTINUOUS PRN
Status: DISCONTINUED | OUTPATIENT
Start: 2025-01-03 | End: 2025-01-06 | Stop reason: HOSPADM

## 2025-01-03 RX ORDER — PANTOPRAZOLE SODIUM 40 MG/1
40 TABLET, DELAYED RELEASE ORAL
Status: DISCONTINUED | OUTPATIENT
Start: 2025-01-04 | End: 2025-01-06 | Stop reason: HOSPADM

## 2025-01-03 RX ORDER — CETIRIZINE HYDROCHLORIDE 10 MG/1
10 TABLET ORAL DAILY
Status: DISCONTINUED | OUTPATIENT
Start: 2025-01-04 | End: 2025-01-06 | Stop reason: HOSPADM

## 2025-01-03 RX ORDER — SODIUM CHLORIDE 0.9 % (FLUSH) 0.9 %
5-40 SYRINGE (ML) INJECTION PRN
Status: DISCONTINUED | OUTPATIENT
Start: 2025-01-03 | End: 2025-01-06 | Stop reason: HOSPADM

## 2025-01-03 RX ORDER — ALBUTEROL SULFATE 0.83 MG/ML
2.5 SOLUTION RESPIRATORY (INHALATION) EVERY 6 HOURS PRN
Status: DISCONTINUED | OUTPATIENT
Start: 2025-01-03 | End: 2025-01-06 | Stop reason: HOSPADM

## 2025-01-03 RX ORDER — IBUPROFEN 600 MG/1
1 TABLET ORAL PRN
Status: DISCONTINUED | OUTPATIENT
Start: 2025-01-03 | End: 2025-01-06 | Stop reason: HOSPADM

## 2025-01-03 RX ORDER — HYDROCHLOROTHIAZIDE 25 MG/1
25 TABLET ORAL DAILY
Status: DISCONTINUED | OUTPATIENT
Start: 2025-01-04 | End: 2025-01-05

## 2025-01-03 RX ADMIN — POTASSIUM BICARBONATE 40 MEQ: 391 TABLET, EFFERVESCENT ORAL at 19:06

## 2025-01-03 ASSESSMENT — LIFESTYLE VARIABLES
HOW OFTEN DO YOU HAVE A DRINK CONTAINING ALCOHOL: NEVER
HOW MANY STANDARD DRINKS CONTAINING ALCOHOL DO YOU HAVE ON A TYPICAL DAY: PATIENT DOES NOT DRINK

## 2025-01-03 NOTE — ED PROVIDER NOTES
interpretation.       I interpreted the X-rays right lower pleural effusion.  ED provider's independent EKG interpretation SR. Rate 88. No STEMI  The patient was admitted and I have discussed patient management with the admitting provider.          History     74-year-old female with a history of hypertension, diabetes, GERD, hypothyroidism, hyperlipidemia, Wang presents emergency department today with complaint of shortness of breath.  She states she has had issues with shortness of breath but symptoms acutely worsened today.  She also reports 1 episode of bright red bleeding from her rectum with a bowel movement today.  She has had no abdominal pain or rectal pain.  She states she has a history of pleural effusions and had 1 L drained about 3 weeks ago.  She feels like her symptoms have gotten worse since having the thoracentesis.  She denies any chest pain, fever, chills, cough, or congestion.  She denies aggravating relieving factors.  She denies any treatment for symptoms.      The history is provided by the patient.       ROS     Review of Systems   Constitutional:  Negative for fever.   Respiratory:  Positive for shortness of breath.    Cardiovascular:  Negative for chest pain.   Gastrointestinal:  Positive for anal bleeding. Negative for abdominal pain, diarrhea, nausea and vomiting.   All other systems reviewed and are negative.       Physical Exam     Vitals signs and nursing note reviewed:  Vitals:    01/03/25 1714 01/03/25 1834 01/03/25 1900   BP: (!) 173/61 (!) 161/59 (!) 160/58   Pulse: 78 89 92   Resp: 22 20 22   Temp: 98.5 °F (36.9 °C)     TempSrc: Oral     SpO2: 98% 97% 96%   Weight: 67.1 kg (148 lb)     Height: 1.499 m (4' 11\")        Physical Exam  Vitals and nursing note reviewed. Exam conducted with a chaperone present (ROSARIO Trevino).   Constitutional:       General: She is not in acute distress.     Appearance: Normal appearance. She is well-developed. She is not ill-appearing, toxic-appearing or  0.8 K/UL    Basophils Absolute 0.0 0.0 - 0.2 K/UL    Immature Granulocytes Absolute 0.0 0.0 - 0.5 K/UL   Comprehensive Metabolic Panel   Result Value Ref Range    Sodium 141 136 - 145 mmol/L    Potassium 3.2 (L) 3.5 - 5.1 mmol/L    Chloride 105 98 - 107 mmol/L    CO2 25 20 - 29 mmol/L    Anion Gap 11 7 - 16 mmol/L    Glucose 98 70 - 99 mg/dL    BUN 11 8 - 23 MG/DL    Creatinine 0.67 0.60 - 1.10 MG/DL    Est, Glom Filt Rate >90 >60 ml/min/1.73m2    Calcium 9.0 8.8 - 10.2 MG/DL    Total Bilirubin 0.9 0.0 - 1.2 MG/DL    ALT 24 8 - 45 U/L    AST 46 (H) 15 - 37 U/L    Alk Phosphatase 120 (H) 35 - 104 U/L    Total Protein 6.5 6.3 - 8.2 g/dL    Albumin 2.7 (L) 3.2 - 4.6 g/dL    Globulin 3.8 (H) 2.3 - 3.5 g/dL    Albumin/Globulin Ratio 0.7 (L) 1.0 - 1.9     Magnesium   Result Value Ref Range    Magnesium 2.0 1.8 - 2.4 mg/dL   EKG 12 Lead   Result Value Ref Range    Ventricular Rate 88 BPM    Atrial Rate 88 BPM    P-R Interval 206 ms    QRS Duration 122 ms    Q-T Interval 422 ms    QTc Calculation (Bazett) 510 ms    P Axis 69 degrees    R Axis 60 degrees    T Axis 42 degrees    Diagnosis       Sinus rhythm with Premature atrial complexes with Aberrant conduction  Right bundle branch block  Abnormal ECG  No previous ECGs available           XR CHEST PORTABLE   Final Result   As above.         Electronically signed by Jeremiah Bolivar                   No results for input(s): \"COVID19\" in the last 72 hours.     Voice dictation software was used during the making of this note.  This software is not perfect and grammatical and other typographical errors may be present.  This note has not been completely proofread for errors.        Vanessa Lane, ARLET - CNP  01/03/25 6278

## 2025-01-03 NOTE — ED TRIAGE NOTES
Pt c/o sob since last night that has worsened today, rectal bleeding that is bright red, pt had 1L pulled off chest wall 3weeks ago as well. Denies chest pain.

## 2025-01-04 PROBLEM — K64.9 HEMORRHOIDS: Status: ACTIVE | Noted: 2025-01-04

## 2025-01-04 LAB
EKG ATRIAL RATE: 88 BPM
EKG DIAGNOSIS: NORMAL
EKG P AXIS: 69 DEGREES
EKG P-R INTERVAL: 206 MS
EKG Q-T INTERVAL: 422 MS
EKG QRS DURATION: 122 MS
EKG QTC CALCULATION (BAZETT): 510 MS
EKG R AXIS: 60 DEGREES
EKG T AXIS: 42 DEGREES
EKG VENTRICULAR RATE: 88 BPM
EST. AVERAGE GLUCOSE BLD GHB EST-MCNC: 126 MG/DL
FERRITIN SERPL-MCNC: 25 NG/ML (ref 8–388)
GLUCOSE BLD STRIP.AUTO-MCNC: 113 MG/DL (ref 65–100)
GLUCOSE BLD STRIP.AUTO-MCNC: 119 MG/DL (ref 65–100)
GLUCOSE BLD STRIP.AUTO-MCNC: 123 MG/DL (ref 65–100)
GLUCOSE BLD STRIP.AUTO-MCNC: 132 MG/DL (ref 65–100)
GLUCOSE BLD STRIP.AUTO-MCNC: 136 MG/DL (ref 65–100)
GLUCOSE BLD STRIP.AUTO-MCNC: 144 MG/DL (ref 65–100)
HBA1C MFR BLD: 6 % (ref 0–5.6)
HCT VFR BLD AUTO: 27 % (ref 35.8–46.3)
HGB BLD-MCNC: 8.6 G/DL (ref 11.7–15.4)
IRON SATN MFR SERPL: 12 % (ref 20–50)
IRON SERPL-MCNC: 41 UG/DL (ref 35–100)
NT PRO BNP: 1993 PG/ML (ref 0–125)
SERVICE CMNT-IMP: ABNORMAL
TIBC SERPL-MCNC: 335 UG/DL (ref 240–450)
UIBC SERPL-MCNC: 294 UG/DL (ref 112–347)

## 2025-01-04 PROCEDURE — 93010 ELECTROCARDIOGRAM REPORT: CPT | Performed by: INTERNAL MEDICINE

## 2025-01-04 PROCEDURE — 6370000000 HC RX 637 (ALT 250 FOR IP): Performed by: INTERNAL MEDICINE

## 2025-01-04 PROCEDURE — 99221 1ST HOSP IP/OBS SF/LOW 40: CPT | Performed by: INTERNAL MEDICINE

## 2025-01-04 PROCEDURE — 6370000000 HC RX 637 (ALT 250 FOR IP): Performed by: HOSPITALIST

## 2025-01-04 PROCEDURE — 2500000003 HC RX 250 WO HCPCS: Performed by: INTERNAL MEDICINE

## 2025-01-04 PROCEDURE — 85018 HEMOGLOBIN: CPT

## 2025-01-04 PROCEDURE — 6360000002 HC RX W HCPCS

## 2025-01-04 PROCEDURE — 83880 ASSAY OF NATRIURETIC PEPTIDE: CPT

## 2025-01-04 PROCEDURE — 83540 ASSAY OF IRON: CPT

## 2025-01-04 PROCEDURE — 97530 THERAPEUTIC ACTIVITIES: CPT

## 2025-01-04 PROCEDURE — 97161 PT EVAL LOW COMPLEX 20 MIN: CPT

## 2025-01-04 PROCEDURE — 82728 ASSAY OF FERRITIN: CPT

## 2025-01-04 PROCEDURE — 2580000003 HC RX 258: Performed by: INTERNAL MEDICINE

## 2025-01-04 PROCEDURE — 36415 COLL VENOUS BLD VENIPUNCTURE: CPT

## 2025-01-04 PROCEDURE — 6360000002 HC RX W HCPCS: Performed by: INTERNAL MEDICINE

## 2025-01-04 PROCEDURE — 82962 GLUCOSE BLOOD TEST: CPT

## 2025-01-04 PROCEDURE — 1100000000 HC RM PRIVATE

## 2025-01-04 PROCEDURE — 85014 HEMATOCRIT: CPT

## 2025-01-04 PROCEDURE — 83550 IRON BINDING TEST: CPT

## 2025-01-04 RX ORDER — HYDROCORTISONE ACETATE 25 MG/1
25 SUPPOSITORY RECTAL 2 TIMES DAILY
Status: DISCONTINUED | OUTPATIENT
Start: 2025-01-04 | End: 2025-01-06 | Stop reason: HOSPADM

## 2025-01-04 RX ORDER — PANTOPRAZOLE SODIUM 40 MG/1
40 TABLET, DELAYED RELEASE ORAL DAILY
COMMUNITY

## 2025-01-04 RX ORDER — KETOROLAC TROMETHAMINE 30 MG/ML
30 INJECTION, SOLUTION INTRAMUSCULAR; INTRAVENOUS ONCE
Status: COMPLETED | OUTPATIENT
Start: 2025-01-04 | End: 2025-01-04

## 2025-01-04 RX ORDER — FAMOTIDINE 20 MG/1
20 TABLET, FILM COATED ORAL
COMMUNITY

## 2025-01-04 RX ORDER — HYDROCODONE BITARTRATE AND HOMATROPINE METHYLBROMIDE ORAL SOLUTION 5; 1.5 MG/5ML; MG/5ML
2.5 LIQUID ORAL EVERY 6 HOURS PRN
COMMUNITY
Start: 2024-12-18

## 2025-01-04 RX ADMIN — Medication 6 MG: at 21:58

## 2025-01-04 RX ADMIN — PANTOPRAZOLE SODIUM 40 MG: 40 TABLET, DELAYED RELEASE ORAL at 06:06

## 2025-01-04 RX ADMIN — HYDROCHLOROTHIAZIDE 25 MG: 25 TABLET ORAL at 08:17

## 2025-01-04 RX ADMIN — ACETAMINOPHEN 650 MG: 325 TABLET, FILM COATED ORAL at 21:58

## 2025-01-04 RX ADMIN — ACETAMINOPHEN 650 MG: 325 TABLET, FILM COATED ORAL at 01:33

## 2025-01-04 RX ADMIN — HYDROCORTISONE ACETATE 25 MG: 25 SUPPOSITORY RECTAL at 21:17

## 2025-01-04 RX ADMIN — GUAIFENESIN 1200 MG: 600 TABLET, EXTENDED RELEASE ORAL at 08:16

## 2025-01-04 RX ADMIN — CETIRIZINE HYDROCHLORIDE 10 MG: 10 TABLET, FILM COATED ORAL at 08:17

## 2025-01-04 RX ADMIN — HYDROCORTISONE ACETATE 25 MG: 25 SUPPOSITORY RECTAL at 14:23

## 2025-01-04 RX ADMIN — LOSARTAN POTASSIUM 100 MG: 50 TABLET, FILM COATED ORAL at 08:15

## 2025-01-04 RX ADMIN — METFORMIN HYDROCHLORIDE 1000 MG: 500 TABLET, EXTENDED RELEASE ORAL at 08:16

## 2025-01-04 RX ADMIN — WATER 1000 MG: 1 INJECTION INTRAMUSCULAR; INTRAVENOUS; SUBCUTANEOUS at 01:00

## 2025-01-04 RX ADMIN — GUAIFENESIN 1200 MG: 600 TABLET, EXTENDED RELEASE ORAL at 01:01

## 2025-01-04 RX ADMIN — PIOGLITAZONE 45 MG: 15 TABLET ORAL at 08:16

## 2025-01-04 RX ADMIN — ACETAMINOPHEN 650 MG: 325 TABLET, FILM COATED ORAL at 15:59

## 2025-01-04 RX ADMIN — GUAIFENESIN 1200 MG: 600 TABLET, EXTENDED RELEASE ORAL at 21:18

## 2025-01-04 RX ADMIN — SODIUM CHLORIDE, PRESERVATIVE FREE 10 ML: 5 INJECTION INTRAVENOUS at 08:18

## 2025-01-04 RX ADMIN — LEVOTHYROXINE SODIUM 100 MCG: 0.1 TABLET ORAL at 06:06

## 2025-01-04 RX ADMIN — POTASSIUM CHLORIDE 20 MEQ: 1500 TABLET, EXTENDED RELEASE ORAL at 08:17

## 2025-01-04 RX ADMIN — SODIUM CHLORIDE: 9 INJECTION, SOLUTION INTRAVENOUS at 00:25

## 2025-01-04 RX ADMIN — KETOROLAC TROMETHAMINE 30 MG: 30 INJECTION, SOLUTION INTRAMUSCULAR at 16:36

## 2025-01-04 RX ADMIN — PANTOPRAZOLE SODIUM 40 MG: 40 TABLET, DELAYED RELEASE ORAL at 14:27

## 2025-01-04 ASSESSMENT — PAIN DESCRIPTION - ORIENTATION
ORIENTATION: POSTERIOR
ORIENTATION: POSTERIOR

## 2025-01-04 ASSESSMENT — PAIN DESCRIPTION - LOCATION
LOCATION: BACK

## 2025-01-04 ASSESSMENT — PAIN DESCRIPTION - DESCRIPTORS
DESCRIPTORS: DULL
DESCRIPTORS: DULL

## 2025-01-04 ASSESSMENT — PAIN SCALES - GENERAL
PAINLEVEL_OUTOF10: 8
PAINLEVEL_OUTOF10: 5
PAINLEVEL_OUTOF10: 8
PAINLEVEL_OUTOF10: 5
PAINLEVEL_OUTOF10: 0

## 2025-01-04 NOTE — ACP (ADVANCE CARE PLANNING)
Advance Care Planning   General Advance Care Planning (ACP) Conversation    Date of Conversation: 1/3/2025  Conducted with: Patient with Decision Making Capacity  Other persons present: None    Healthcare Decision Maker:    Primary Decision Maker: Abilio Luciano Jr. - Child - [phone]    Primary Decision Maker: Angel Luciano - Child - [phone]    Today we documented Decision Maker(s) consistent with Legal Next of Kin hierarchy.      Length of Voluntary ACP Conversation in minutes:  <16 minutes (Non-Billable)    Roxanna Petty RN

## 2025-01-04 NOTE — ED NOTES
TRANSFER - OUT REPORT:    Verbal report given to Karol PONCE on Poonam Luciano  being transferred to [facility] for routine progression of patient care       Report consisted of patient's Situation, Background, Assessment and   Recommendations(SBAR).     Information from the following report(s) ED SBAR was reviewed with the receiving nurse.    Lines:   Peripheral IV 01/03/25 Right Antecubital (Active)   Site Assessment Clean, dry & intact 01/03/25 1756   Line Status Blood return noted;Specimen collected;Flushed;Normal saline locked 01/03/25 1756   Phlebitis Assessment No symptoms 01/03/25 1756   Infiltration Assessment 0 01/03/25 1756        Opportunity for questions and clarification was provided.      Patient transported with:  Registered Nurse

## 2025-01-04 NOTE — CONSULTS
Assessment/Plan  Ms. Poonam Luciano is a 74 y.o. female presenting with rectal bleeding.  She has a history of internal hemorrhoids that have bled in the past.  Last bowel movement was completely normal with no evidence of bleeding.  Would recommend starting Anusol suppository twice daily.  Recommend to continue suppositories for 14 days.  Would recommend to follow-up with Dr. Hughes office to discuss hemorrhoid banding.  No plans for inpatient colonoscopy at this time.  GI will sign off.  Reconsult needed.    MEDs:     Current Facility-Administered Medications   Medication Dose Route Frequency Provider Last Rate Last Admin    guaiFENesin (MUCINEX) extended release tablet 1,200 mg  1,200 mg Oral BID Reji Mariano MD   1,200 mg at 01/04/25 0816    cetirizine (ZYRTEC) tablet 10 mg  10 mg Oral Daily Reji Mariano MD   10 mg at 01/04/25 0817    fluticasone (FLONASE) 50 MCG/ACT nasal spray 2 spray  2 spray Nasal Daily PRN Reji Mariano MD        hydroCHLOROthiazide (HYDRODIURIL) tablet 25 mg  25 mg Oral Daily Reji Mariano MD   25 mg at 01/04/25 0817    levothyroxine (SYNTHROID) tablet 100 mcg  100 mcg Oral Daily Reji Mariano MD   100 mcg at 01/04/25 0606    losartan (COZAAR) tablet 100 mg  100 mg Oral Daily Reji Mariano MD   100 mg at 01/04/25 0815    metFORMIN (GLUCOPHAGE-XR) extended release tablet 1,000 mg  1,000 mg Oral Daily with breakfast Reji Mariano MD   1,000 mg at 01/04/25 0816    pantoprazole (PROTONIX) tablet 40 mg  40 mg Oral BID AC Reji Mariano MD   40 mg at 01/04/25 0606    pioglitazone (ACTOS) tablet 45 mg  45 mg Oral Daily Reji Mariano MD   45 mg at 01/04/25 0816    potassium chloride (KLOR-CON M) extended release tablet 20 mEq  20 mEq Oral Daily Reji Mariano MD   20 mEq at 01/04/25 0817    [START ON 1/6/2025] vitamin D

## 2025-01-04 NOTE — CARE COORDINATION
RNCM met with patient in room 364 to discuss discharge planning.       Patient presents to assessment alert and oriented, and answers questions appropriately.  Patient typically lives at home alone.  At baseline, patient is independent with ADLs and does not utilize assistive devices for ambulation.    Demographics verified. Patient has Medicare A/B and [employer] insurance.  PCP is Dr. Demetrius Almodovar.    At this time, anticipate patient to be discharged with no further case management needs.  Case management will continue to follow.  Please notify if there are any changes.          01/04/25 8163   Service Assessment   Patient Orientation Alert and Oriented;Person;Place;Situation;Self   Cognition Alert   History Provided By Patient   Primary Caregiver Self   Accompanied By/Relationship n/a   Support Systems Children;Family Members   Patient's Healthcare Decision Maker is: Legal Next of Kin  (\"My Sons, Angel and Abilio\")   PCP Verified by CM Yes   Last Visit to PCP Within last 3 months   Prior Functional Level Independent in ADLs/IADLs   Current Functional Level Independent in ADLs/IADLs   Can patient return to prior living arrangement Yes   Ability to make needs known: Good   Family able to assist with home care needs: Yes   Would you like for me to discuss the discharge plan with any other family members/significant others, and if so, who? No   Financial Resources Medicare;Other (Comment)  ([employer])   Community Resources None   Social/Functional History   Lives With Alone   Type of Home House   Home Layout One level   Home Access Level entry   Prior Level of Assist for ADLs Independent   Prior Level of Assist for Homemaking Independent   Ambulation Assistance Independent   Prior Level of Assist for Transfers Independent   Active  Yes   Mode of Transportation Car   Occupation Retired   Discharge Planning   Type of Residence House   Living Arrangements Alone   Current Services Prior To Admission None   Potential  Assistance Needed N/A   DME Ordered? No   Potential Assistance Purchasing Medications No   Type of Home Care Services None   Patient expects to be discharged to: House   One/Two Story Residence One story   Services At/After Discharge   Transition of Care Consult (CM Consult) N/A   Mode of Transport at Discharge Self   Confirm Follow Up Transport Family   Condition of Participation: Discharge Planning   The Plan for Transition of Care is related to the following treatment goals: return to baseline level of function

## 2025-01-04 NOTE — PROGRESS NOTES
ACUTE PHYSICAL THERAPY GOALS:   (Developed with and agreed upon by patient and/or caregiver.)  (1.) Poonam Luciano  will move from supine to sit and sit to supine  with INDEPENDENCE within 4-7 treatment day(s).    (2.) Poonam Luciano will transfer from bed to chair and chair to bed with INDEPENDENCE using the least restrictive device within 4-7 treatment day(s).    (3.) Poonam Luciano will ambulate with INDEPENDENCE for 100 feet with the least restrictive device within 4-7 treatment day(s).   (4.) Poonam Luciano will perform therapeutic exercises x 10 min for HEP with MINIMAL ASSIST to improve strength, endurance, and functional mobility within 4-7 treatment day(s).      PHYSICAL THERAPY Initial Assessment and PM  (Link to Caseload Tracking: PT Visit Days : 1  Acknowledge Orders  Time In/Out  PT Charge Capture  Rehab Caseload Tracker    Poonam Luciano is a 74 y.o. female   PRIMARY DIAGNOSIS: Lower GI bleeding  Rectal bleeding [K62.5]  Lower GI bleeding [K92.2]  MANCINI (dyspnea on exertion) [R06.09]  Pleural effusion [J90]       Reason for Referral: Generalized Muscle Weakness (M62.81)  Difficulty in walking, Not elsewhere classified (R26.2)  Inpatient: Payor: MEDICARE / Plan: MEDICARE PART A AND B / Product Type: *No Product type* /     ASSESSMENT:     REHAB RECOMMENDATIONS:   Recommendation to date pending progress:  Setting:  No further skilled physical therapy after discharge from hospital     Equipment:    None     ASSESSMENT:  Ms. Luciano is a 74 year old female who came to the hospital with shortness of breath and rectal bleeding. She had one liter of fluid pulled off chest wall 3 weeks ago. She lives at home alone where she was independent with mobility without assistive devices. 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 Her son is at bedside today. Her HGB today is 8.5 and she is being followed by GI and pulm. She reports that at times her left hip  CGA Min Mod Max Total  NT x2 Comments:   Level of Assistance [] [] [x] [] [] [] [] [] [] [] []    Distance 15 feet    DME None, did a little furniture walking    Gait Quality Decreased emerson  and Decreased step length    Weightbearing Status      Stairs      I=Independent, Mod I=Modified Independent, S=Supervision, SBA=Standby Assistance, CGA=Contact Guard Assistance,   Min=Minimal Assistance, Mod=Moderate Assistance, Max=Maximal Assistance, Total=Total Assistance, NT=Not Tested    PLAN:   FREQUENCY AND DURATION: Daily for duration of hospital stay or until stated goals are met, whichever comes first.    THERAPY PROGNOSIS: Good    PROBLEM LIST:   (Skilled intervention is medically necessary to address:)  Decreased ADL/Functional Activities  Decreased Activity Tolerance  Decreased Balance  Decreased Strength  Decreased Transfer Abilities INTERVENTIONS PLANNED:   (Benefits and precautions of physical therapy have been discussed with the patient.)  Self Care Training  Therapeutic Activity  Therapeutic Exercise/HEP  Education       TREATMENT:   EVALUATION: LOW COMPLEXITY: (Untimed Charge)  The initial evaluation charge encompasses clinical chart review, objective assessment, interpretation of assessment, and skilled monitoring of the patient's response to treatment in order to develop a plan of care.     TREATMENT:   Therapeutic Activity (8 Minutes): Therapeutic activity included Sit to Supine, Transfer Training, Ambulation on level ground, Sitting balance , and Standing balance to improve functional Activity tolerance, Balance, Coordination, Mobility, and Strength.    TREATMENT GRID:  N/A    AFTER TREATMENT PRECAUTIONS: Bed, Bed/Chair Locked, Call light within reach, Needs within reach, and RN notified    INTERDISCIPLINARY COLLABORATION:  RN/ PCT    EDUCATION: Education Given To: Patient  Education Provided: Role of Therapy;Plan of Care  Education Outcome: Verbalized understanding;Demonstrated understanding    TIME

## 2025-01-04 NOTE — PROGRESS NOTES
Hospitalist Progress Note   Admit Date:  1/3/2025  5:33 PM   Name:  Poonam Luciano   Age:  74 y.o.  Sex:  female  :  1950   MRN:  [id number]   Room:  [facility]/    Presenting/Chief Complaint: Shortness of Breath     Reason(s) for Admission: Rectal bleeding [K62.5]  Lower GI bleeding [K92.2]  MANCINI (dyspnea on exertion) [R06.09]  Pleural effusion [J90]     Hospital Course:   Poonam Luciano is a 74 y.o. female with medical history of type 2 diabetes mellitus, hypertension, TRACY, hyperlipidemia, hypothyroidism, GERD, and recent right pleural effusion status postthoracentesis who presents to the ED with shortness of breath and bright red blood per rectum.  Admitted for acute blood loss anemia in setting of possible lower GI bleed and shortness of breath secondary to large right pleural effusion.  Echocardiography pending.  Pulmonology consulted.  Gastroenterology consulted.  I would not anticipate any intervention given recent colonoscopy 6 months ago was unremarkable except for some internal hemorrhoids.    Subjective & 24hr Events:   Overnight events  Anusol suppositories given for hemorrhoids  Pulmonology consulted for pleural effusion  Will need bedside ultrasound for possible thoracentesis  Echocardiography pending    Assessment & Plan:     Large right pleural effusion  -Chest x-ray on  with significant for pleural effusion.  -Follows with pulmonology outpatient. Last thoracentesis on .  -Lights criteria on  was transudative  -Pulmonology consulted on .  -BNP pending  -Echocardiography pending    Bright red blood per rectum  History of hemorrhoids  -H&H 8.5 on admission.  Previously 11.7  -Protonix 40 mg twice daily  -Gastroenterology consulted.  No intervention.  -Colonoscopy 6 months ago normal except for hemorrhoids  -Anusol suppositories twice daily for 14 days    Metabolic associated steatohepatitis  Overweight  -BMI 29.89 kg/m²  -Encourage weight loss  -Complicates all  data filed at 1/4/2025 0818  Gross per 24 hour   Intake 10 ml   Output --   Net 10 ml       Physical Exam:     General:    Well nourished.    Head:  Normocephalic, atraumatic  Eyes:  Sclerae appear normal.  Pupils equally round.  ENT:  Nares appear normal.  Moist oral mucosa  Neck:  No restricted ROM.  Trachea midline   CV:   RRR.  No m/r/g.  No jugular venous distension.  Lungs:   CTAB.  No wheezing, rhonchi, or rales.  Symmetric expansion.  Abdomen:   Soft, nontender, nondistended.  Extremities: No cyanosis or clubbing.  No edema  Skin:     No rashes.  Normal coloration.   Warm and dry.    Neuro:  CN II-XII grossly intact.    Psych:  Normal mood and affect.      I have personally reviewed labs and tests:  Recent Labs:  Recent Results (from the past 48 hour(s))   EKG 12 Lead    Collection Time: 01/03/25  5:49 PM   Result Value Ref Range    Ventricular Rate 88 BPM    Atrial Rate 88 BPM    P-R Interval 206 ms    QRS Duration 122 ms    Q-T Interval 422 ms    QTc Calculation (Bazett) 510 ms    P Axis 69 degrees    R Axis 60 degrees    T Axis 42 degrees    Diagnosis       Sinus rhythm with Premature atrial complexes with Aberrant conduction  Right bundle branch block  No previous ECGs available  Confirmed by Tez Abdullahi MD ([id number]) on 1/4/2025 6:14:33 AM     CBC with Auto Differential    Collection Time: 01/03/25  5:58 PM   Result Value Ref Range    WBC 2.3 (L) 4.3 - 11.1 K/uL    RBC 3.15 (L) 4.05 - 5.2 M/uL    Hemoglobin 8.5 (L) 11.7 - 15.4 g/dL    Hematocrit 27.1 (L) 35.8 - 46.3 %    MCV 86.0 82.0 - 102.0 FL    MCH 27.0 26.1 - 32.9 PG    MCHC 31.4 31.4 - 35.0 g/dL    RDW 17.0 (H) 11.9 - 14.6 %    Platelets 158 150 - 450 K/uL    MPV 8.9 (L) 9.4 - 12.3 FL    nRBC 0.00 0.0 - 0.2 K/uL    Differential Type AUTOMATED      Neutrophils % 52 43 - 78 %    Lymphocytes % 33 13 - 44 %    Monocytes % 12 4.0 - 12.0 %    Eosinophils % 2 0.5 - 7.8 %    Basophils % 0 0.0 - 2.0 %    Immature Granulocytes % 0 0.0 - 5.0 %

## 2025-01-04 NOTE — H&P
Hospitalist History and Physical   Admit Date:  1/3/2025  5:33 PM   Name:  Poonam Luciano   Age:  74 y.o.  Sex:  female  :  1950   MRN:  [id number]   Room:  [place] [address]    Presenting/Chief Complaint: Shortness of Breath     Reason(s) for Admission: No admission diagnoses are documented for this encounter.     History of Present Illness:   Poonam Luciano is a 74 y.o. female with medical history of type 2 diabetes, hypertension, TRACY, hyperlipidemia, hypothyroidism, GERD, had a recent right pleural effusion that was drained by pulmonary approximately 3 weeks ago.  Patient apparently was on the toilet this morning had bright red blood per rectum.  She has follow up with gi and next egd scheduled sometime in 2025.    Chest x-ray suggested the recent right pleural effusion that was tapped about 21 days ago has reaccumulated and may need a repeat tap.    Gi and pulm consults need to be called in am.    Assessment & Plan:     Active Problems:  Lower GI bleeding  Patient does follow with GI closely and  was given outpatient EGD done coming up in about 2 months  She claims that she was told that this is the last year  We will go ahead and start the patient on some Protonix as we do not know what the source of the bleeding is  Patient's H/H dropped from 11.7/35--------------------8.5/27  Consult GI in a.m. keep her on the H&H and transfuse if hemoglobin less than 7      Right pleural effusion status post drainage by pulmonary critical care 21 days ago  Repeat chest x-ray does suggest that the fluid re accumulated on the right side       PT/OT evals ordered?  Defer to primary team    Diet: clear liq diet    VTE prophylaxis: Lovenox    Code status: full    Code status discussed: Yes    Blood consent obtained: No      Non-peripheral Lines and Tubes (if present):             Hospital Problems:  Active Problems:    * No active hospital problems. *  Resolved Problems:    * No resolved hospital

## 2025-01-04 NOTE — PLAN OF CARE
Problem: Chronic Conditions and Co-morbidities  Goal: Patient's chronic conditions and co-morbidity symptoms are monitored and maintained or improved  1/4/2025 1335 by Kayla Avalos, RN  Outcome: Progressing  1/4/2025 0326 by Carli Clark RN  Outcome: Progressing     Problem: Discharge Planning  Goal: Discharge to home or other facility with appropriate resources  1/4/2025 1335 by Kayla Avalos, RN  Outcome: Progressing  1/4/2025 0326 by Carli Clark RN  Outcome: Progressing     Problem: Safety - Adult  Goal: Free from fall injury  Outcome: Progressing

## 2025-01-05 ENCOUNTER — APPOINTMENT (OUTPATIENT)
Dept: NON INVASIVE DIAGNOSTICS | Age: 75
DRG: 291 | End: 2025-01-05
Payer: MEDICARE

## 2025-01-05 ENCOUNTER — APPOINTMENT (OUTPATIENT)
Dept: GENERAL RADIOLOGY | Age: 75
DRG: 291 | End: 2025-01-05
Payer: MEDICARE

## 2025-01-05 LAB
ANION GAP SERPL CALC-SCNC: 12 MMOL/L (ref 7–16)
BASOPHILS # BLD: 0 K/UL (ref 0–0.2)
BASOPHILS NFR BLD: 0 % (ref 0–2)
BUN SERPL-MCNC: 13 MG/DL (ref 8–23)
CALCIUM SERPL-MCNC: 9.1 MG/DL (ref 8.8–10.2)
CHLORIDE SERPL-SCNC: 102 MMOL/L (ref 98–107)
CO2 SERPL-SCNC: 23 MMOL/L (ref 20–29)
CREAT SERPL-MCNC: 0.72 MG/DL (ref 0.6–1.1)
DIFFERENTIAL METHOD BLD: ABNORMAL
ECHO AO ASC DIAM: 3 CM
ECHO AO ASCENDING AORTA INDEX: 1.85 CM/M2
ECHO AO ROOT DIAM: 3.3 CM
ECHO AO ROOT INDEX: 2.04 CM/M2
ECHO AV AREA PEAK VELOCITY: 2.6 CM2
ECHO AV AREA VTI: 2.5 CM2
ECHO AV AREA/BSA PEAK VELOCITY: 1.6 CM2/M2
ECHO AV AREA/BSA VTI: 1.5 CM2/M2
ECHO AV MEAN GRADIENT: 5 MMHG
ECHO AV MEAN VELOCITY: 1.1 M/S
ECHO AV PEAK GRADIENT: 10 MMHG
ECHO AV PEAK VELOCITY: 1.6 M/S
ECHO AV VELOCITY RATIO: 0.81
ECHO AV VTI: 33 CM
ECHO BSA: 1.67 M2
ECHO EST RA PRESSURE: 8 MMHG
ECHO IVC EXP: 1.9 CM
ECHO LA AREA 2C: 20.4 CM2
ECHO LA AREA 4C: 21.3 CM2
ECHO LA DIAMETER INDEX: 2.35 CM/M2
ECHO LA DIAMETER: 3.8 CM
ECHO LA MAJOR AXIS: 5.8 CM
ECHO LA MINOR AXIS: 5.6 CM
ECHO LA TO AORTIC ROOT RATIO: 1.15
ECHO LA VOL BP: 63 ML (ref 22–52)
ECHO LA VOL MOD A2C: 61 ML (ref 22–52)
ECHO LA VOL MOD A4C: 63 ML (ref 22–52)
ECHO LA VOL/BSA BIPLANE: 39 ML/M2 (ref 16–34)
ECHO LA VOLUME INDEX MOD A2C: 38 ML/M2 (ref 16–34)
ECHO LA VOLUME INDEX MOD A4C: 39 ML/M2 (ref 16–34)
ECHO LV E' LATERAL VELOCITY: 7.54 CM/S
ECHO LV E' SEPTAL VELOCITY: 5.9 CM/S
ECHO LV EDV A2C: 147 ML
ECHO LV EDV A4C: 131 ML
ECHO LV EDV INDEX A4C: 81 ML/M2
ECHO LV EDV NDEX A2C: 91 ML/M2
ECHO LV EJECTION FRACTION A2C: 33 %
ECHO LV EJECTION FRACTION A4C: 26 %
ECHO LV EJECTION FRACTION BIPLANE: 40 % (ref 55–100)
ECHO LV ESV A2C: 99 ML
ECHO LV ESV A4C: 97 ML
ECHO LV ESV INDEX A2C: 61 ML/M2
ECHO LV ESV INDEX A4C: 60 ML/M2
ECHO LV FRACTIONAL SHORTENING: 27 % (ref 28–44)
ECHO LV INTERNAL DIMENSION DIASTOLE INDEX: 2.72 CM/M2
ECHO LV INTERNAL DIMENSION DIASTOLIC: 4.4 CM (ref 3.9–5.3)
ECHO LV INTERNAL DIMENSION SYSTOLIC INDEX: 1.98 CM/M2
ECHO LV INTERNAL DIMENSION SYSTOLIC: 3.2 CM
ECHO LV IVSD: 1.3 CM (ref 0.6–0.9)
ECHO LV MASS 2D: 203 G (ref 67–162)
ECHO LV MASS INDEX 2D: 125.3 G/M2 (ref 43–95)
ECHO LV POSTERIOR WALL DIASTOLIC: 1.2 CM (ref 0.6–0.9)
ECHO LV RELATIVE WALL THICKNESS RATIO: 0.55
ECHO LVOT AREA: 3.1 CM2
ECHO LVOT AV VTI INDEX: 0.78
ECHO LVOT DIAM: 2 CM
ECHO LVOT MEAN GRADIENT: 3 MMHG
ECHO LVOT PEAK GRADIENT: 7 MMHG
ECHO LVOT PEAK VELOCITY: 1.3 M/S
ECHO LVOT STROKE VOLUME INDEX: 50.2 ML/M2
ECHO LVOT SV: 81.3 ML
ECHO LVOT VTI: 25.9 CM
ECHO MV A VELOCITY: 1.22 M/S
ECHO MV AREA VTI: 2.9 CM2
ECHO MV E DECELERATION TIME (DT): 117 MS
ECHO MV E VELOCITY: 1.37 M/S
ECHO MV E/A RATIO: 1.12
ECHO MV E/E' LATERAL: 18.17
ECHO MV E/E' RATIO (AVERAGED): 20.7
ECHO MV E/E' SEPTAL: 23.22
ECHO MV LVOT VTI INDEX: 1.1
ECHO MV MAX VELOCITY: 1.6 M/S
ECHO MV MEAN GRADIENT: 6 MMHG
ECHO MV MEAN VELOCITY: 1.1 M/S
ECHO MV PEAK GRADIENT: 10 MMHG
ECHO MV VTI: 28.5 CM
ECHO PV ACCELERATION TIME (AT): 95 MS
ECHO PV MAX VELOCITY: 1.1 M/S
ECHO PV PEAK GRADIENT: 5 MMHG
ECHO RIGHT VENTRICULAR SYSTOLIC PRESSURE (RVSP): 18 MMHG
ECHO RV BASAL DIMENSION: 3.5 CM
ECHO RV FREE WALL PEAK S': 12.6 CM/S
ECHO RV TAPSE: 2 CM (ref 1.7–?)
ECHO TV REGURGITANT MAX VELOCITY: 1.58 M/S
ECHO TV REGURGITANT PEAK GRADIENT: 10 MMHG
EOSINOPHIL # BLD: 0 K/UL (ref 0–0.8)
EOSINOPHIL NFR BLD: 1 % (ref 0.5–7.8)
ERYTHROCYTE [DISTWIDTH] IN BLOOD BY AUTOMATED COUNT: 17.1 % (ref 11.9–14.6)
GLUCOSE BLD STRIP.AUTO-MCNC: 122 MG/DL (ref 65–100)
GLUCOSE BLD STRIP.AUTO-MCNC: 138 MG/DL (ref 65–100)
GLUCOSE BLD STRIP.AUTO-MCNC: 150 MG/DL (ref 65–100)
GLUCOSE BLD STRIP.AUTO-MCNC: 166 MG/DL (ref 65–100)
GLUCOSE SERPL-MCNC: 161 MG/DL (ref 70–99)
HCT VFR BLD AUTO: 28.3 % (ref 35.8–46.3)
HGB BLD-MCNC: 9 G/DL (ref 11.7–15.4)
IMM GRANULOCYTES # BLD AUTO: 0 K/UL (ref 0–0.5)
IMM GRANULOCYTES NFR BLD AUTO: 0 % (ref 0–5)
INR PPP: 1.1
LYMPHOCYTES # BLD: 0.6 K/UL (ref 0.5–4.6)
LYMPHOCYTES NFR BLD: 19 % (ref 13–44)
MCH RBC QN AUTO: 27.4 PG (ref 26.1–32.9)
MCHC RBC AUTO-ENTMCNC: 31.8 G/DL (ref 31.4–35)
MCV RBC AUTO: 86.3 FL (ref 82–102)
MONOCYTES # BLD: 0.4 K/UL (ref 0.1–1.3)
MONOCYTES NFR BLD: 11 % (ref 4–12)
NEUTS SEG # BLD: 2.3 K/UL (ref 1.7–8.2)
NEUTS SEG NFR BLD: 68 % (ref 43–78)
NRBC # BLD: 0 K/UL (ref 0–0.2)
PLATELET # BLD AUTO: 120 K/UL (ref 150–450)
PMV BLD AUTO: 9.4 FL (ref 9.4–12.3)
POTASSIUM SERPL-SCNC: 3.8 MMOL/L (ref 3.5–5.1)
PROTHROMBIN TIME: 14.8 SEC (ref 11.3–14.9)
RBC # BLD AUTO: 3.28 M/UL (ref 4.05–5.2)
SERVICE CMNT-IMP: ABNORMAL
SODIUM SERPL-SCNC: 137 MMOL/L (ref 136–145)
WBC # BLD AUTO: 3.4 K/UL (ref 4.3–11.1)

## 2025-01-05 PROCEDURE — 6360000004 HC RX CONTRAST MEDICATION

## 2025-01-05 PROCEDURE — 6360000002 HC RX W HCPCS

## 2025-01-05 PROCEDURE — 82962 GLUCOSE BLOOD TEST: CPT

## 2025-01-05 PROCEDURE — 6370000000 HC RX 637 (ALT 250 FOR IP): Performed by: HOSPITALIST

## 2025-01-05 PROCEDURE — 93306 TTE W/DOPPLER COMPLETE: CPT | Performed by: INTERNAL MEDICINE

## 2025-01-05 PROCEDURE — C8929 TTE W OR WO FOL WCON,DOPPLER: HCPCS

## 2025-01-05 PROCEDURE — 2500000003 HC RX 250 WO HCPCS

## 2025-01-05 PROCEDURE — 2700000000 HC OXYGEN THERAPY PER DAY

## 2025-01-05 PROCEDURE — 85025 COMPLETE CBC W/AUTO DIFF WBC: CPT

## 2025-01-05 PROCEDURE — 85610 PROTHROMBIN TIME: CPT

## 2025-01-05 PROCEDURE — 80048 BASIC METABOLIC PNL TOTAL CA: CPT

## 2025-01-05 PROCEDURE — 6370000000 HC RX 637 (ALT 250 FOR IP): Performed by: INTERNAL MEDICINE

## 2025-01-05 PROCEDURE — 36415 COLL VENOUS BLD VENIPUNCTURE: CPT

## 2025-01-05 PROCEDURE — 6370000000 HC RX 637 (ALT 250 FOR IP)

## 2025-01-05 PROCEDURE — 94640 AIRWAY INHALATION TREATMENT: CPT

## 2025-01-05 PROCEDURE — 2500000003 HC RX 250 WO HCPCS: Performed by: INTERNAL MEDICINE

## 2025-01-05 PROCEDURE — 6360000002 HC RX W HCPCS: Performed by: INTERNAL MEDICINE

## 2025-01-05 PROCEDURE — 71045 X-RAY EXAM CHEST 1 VIEW: CPT

## 2025-01-05 PROCEDURE — 1100000000 HC RM PRIVATE

## 2025-01-05 PROCEDURE — 94761 N-INVAS EAR/PLS OXIMETRY MLT: CPT

## 2025-01-05 RX ORDER — FUROSEMIDE 10 MG/ML
40 INJECTION INTRAMUSCULAR; INTRAVENOUS 2 TIMES DAILY
Status: DISCONTINUED | OUTPATIENT
Start: 2025-01-05 | End: 2025-01-05

## 2025-01-05 RX ORDER — SPIRONOLACTONE 25 MG/1
25 TABLET ORAL DAILY
Status: DISCONTINUED | OUTPATIENT
Start: 2025-01-05 | End: 2025-01-06 | Stop reason: HOSPADM

## 2025-01-05 RX ORDER — METOPROLOL SUCCINATE 25 MG/1
25 TABLET, EXTENDED RELEASE ORAL DAILY
Status: DISCONTINUED | OUTPATIENT
Start: 2025-01-05 | End: 2025-01-06 | Stop reason: HOSPADM

## 2025-01-05 RX ORDER — FUROSEMIDE 10 MG/ML
40 INJECTION INTRAMUSCULAR; INTRAVENOUS DAILY
Status: DISCONTINUED | OUTPATIENT
Start: 2025-01-06 | End: 2025-01-06 | Stop reason: HOSPADM

## 2025-01-05 RX ADMIN — SPIRONOLACTONE 25 MG: 25 TABLET ORAL at 15:13

## 2025-01-05 RX ADMIN — GUAIFENESIN 1200 MG: 600 TABLET, EXTENDED RELEASE ORAL at 08:01

## 2025-01-05 RX ADMIN — POTASSIUM CHLORIDE 20 MEQ: 1500 TABLET, EXTENDED RELEASE ORAL at 08:01

## 2025-01-05 RX ADMIN — FUROSEMIDE 40 MG: 10 INJECTION, SOLUTION INTRAMUSCULAR; INTRAVENOUS at 11:47

## 2025-01-05 RX ADMIN — LOSARTAN POTASSIUM 100 MG: 50 TABLET, FILM COATED ORAL at 08:01

## 2025-01-05 RX ADMIN — METOPROLOL SUCCINATE 25 MG: 25 TABLET, EXTENDED RELEASE ORAL at 15:18

## 2025-01-05 RX ADMIN — CETIRIZINE HYDROCHLORIDE 10 MG: 10 TABLET, FILM COATED ORAL at 08:02

## 2025-01-05 RX ADMIN — SODIUM CHLORIDE, PRESERVATIVE FREE 10 ML: 5 INJECTION INTRAVENOUS at 08:03

## 2025-01-05 RX ADMIN — EMPAGLIFLOZIN 10 MG: 10 TABLET, FILM COATED ORAL at 15:13

## 2025-01-05 RX ADMIN — PANTOPRAZOLE SODIUM 40 MG: 40 TABLET, DELAYED RELEASE ORAL at 05:44

## 2025-01-05 RX ADMIN — HYDROCORTISONE ACETATE 25 MG: 25 SUPPOSITORY RECTAL at 08:03

## 2025-01-05 RX ADMIN — HYDROCORTISONE ACETATE 25 MG: 25 SUPPOSITORY RECTAL at 20:47

## 2025-01-05 RX ADMIN — PIOGLITAZONE 45 MG: 15 TABLET ORAL at 08:01

## 2025-01-05 RX ADMIN — SACUBITRIL AND VALSARTAN 1 TABLET: 24; 26 TABLET, FILM COATED ORAL at 15:12

## 2025-01-05 RX ADMIN — ACETAMINOPHEN 650 MG: 325 TABLET, FILM COATED ORAL at 20:46

## 2025-01-05 RX ADMIN — GUAIFENESIN 1200 MG: 600 TABLET, EXTENDED RELEASE ORAL at 20:46

## 2025-01-05 RX ADMIN — SODIUM CHLORIDE, PRESERVATIVE FREE 0.45 ML: 5 INJECTION INTRAVENOUS at 09:50

## 2025-01-05 RX ADMIN — ALBUTEROL SULFATE 2.5 MG: 2.5 SOLUTION RESPIRATORY (INHALATION) at 12:40

## 2025-01-05 RX ADMIN — HYDROCHLOROTHIAZIDE 25 MG: 25 TABLET ORAL at 08:02

## 2025-01-05 RX ADMIN — Medication 6 MG: at 21:48

## 2025-01-05 RX ADMIN — METFORMIN HYDROCHLORIDE 1000 MG: 500 TABLET, EXTENDED RELEASE ORAL at 08:01

## 2025-01-05 RX ADMIN — PANTOPRAZOLE SODIUM 40 MG: 40 TABLET, DELAYED RELEASE ORAL at 15:14

## 2025-01-05 RX ADMIN — LEVOTHYROXINE SODIUM 100 MCG: 0.1 TABLET ORAL at 05:44

## 2025-01-05 RX ADMIN — ACETAMINOPHEN 650 MG: 325 TABLET, FILM COATED ORAL at 05:45

## 2025-01-05 ASSESSMENT — PAIN DESCRIPTION - ORIENTATION: ORIENTATION: POSTERIOR

## 2025-01-05 ASSESSMENT — PAIN DESCRIPTION - ONSET: ONSET: ON-GOING

## 2025-01-05 ASSESSMENT — PAIN SCALES - GENERAL
PAINLEVEL_OUTOF10: 7
PAINLEVEL_OUTOF10: 3

## 2025-01-05 ASSESSMENT — PAIN DESCRIPTION - LOCATION
LOCATION: BACK
LOCATION: HEAD
LOCATION: BACK

## 2025-01-05 ASSESSMENT — PAIN DESCRIPTION - PAIN TYPE: TYPE: CHRONIC PAIN

## 2025-01-05 ASSESSMENT — PAIN - FUNCTIONAL ASSESSMENT: PAIN_FUNCTIONAL_ASSESSMENT: ACTIVITIES ARE NOT PREVENTED

## 2025-01-05 ASSESSMENT — PAIN DESCRIPTION - DESCRIPTORS: DESCRIPTORS: DULL

## 2025-01-05 NOTE — PLAN OF CARE
Problem: Chronic Conditions and Co-morbidities  Goal: Patient's chronic conditions and co-morbidity symptoms are monitored and maintained or improved  1/5/2025 0928 by Kayla Avalos RN  Outcome: Progressing  1/5/2025 0025 by Carli Clark RN  Outcome: Progressing     Problem: Discharge Planning  Goal: Discharge to home or other facility with appropriate resources  1/5/2025 0928 by Kayla Avalos, RN  Outcome: Progressing  1/5/2025 0025 by Carli Clark RN  Outcome: Progressing     Problem: Safety - Adult  Goal: Free from fall injury  1/5/2025 0928 by Kayla Avalos RN  Outcome: Progressing  1/5/2025 0025 by Carli Clark RN  Outcome: Progressing     Problem: Pain  Goal: Verbalizes/displays adequate comfort level or baseline comfort level  Outcome: Progressing

## 2025-01-05 NOTE — PROGRESS NOTES
Hospitalist Progress Note   Admit Date:  1/3/2025  5:33 PM   Name:  Poonam Luciano   Age:  74 y.o.  Sex:  female  :  1950   MRN:  [id number]   Room:  [facility]/    Presenting/Chief Complaint: Shortness of Breath     Reason(s) for Admission: Rectal bleeding [K62.5]  Lower GI bleeding [K92.2]  MANCINI (dyspnea on exertion) [R06.09]  Pleural effusion [J90]     Hospital Course:   Poonam Luciano is a 74 y.o. female with medical history of type 2 diabetes mellitus, hypertension, TRACY, hyperlipidemia, hypothyroidism, GERD, and recent right pleural effusion status postthoracentesis who presents to the ED with shortness of breath and bright red blood per rectum.  Admitted for acute blood loss anemia in setting of possible lower GI bleed and shortness of breath secondary to large right pleural effusion.  Echocardiography EF 35 to 40%.  Left atrium mildly dilated. Pulmonology consulted.  Gastroenterology consulted.  I would not anticipate any intervention given recent colonoscopy 6 months ago was unremarkable except for some internal hemorrhoids.    Subjective & 24hr Events:   Overnight events  Anusol suppositories given for hemorrhoids  I performed thoracentesis on .  1.6 L of yellow fluid removed.  Echocardiography as above    Assessment & Plan:     Large right pleural effusion  Acute hypoxic respiratory failure 2/2 above  -Chest x-ray on  with significant for pleural effusion.  -Follows with pulmonology outpatient. Last thoracentesis on .  -Lights criteria on  was transudative  -Pulmonology consulted on .  -Thoracentesis on .  1.6 L yellow fluid removed.  -BNP 1,993  -Echocardiography EF 35 to 40%.  Left atrium mildly dilated.    Heart failure reduced ejection fraction  -BNP 1,993  -Echo as above  -Start GDMT (Entresto, Toprol-XL, Aldactone, and Farxiga)  -Cardiology consulted    Bright red blood per rectum  History of hemorrhoids  -H&H 8.5 on admission.  Previously 11.7  -Protonix  40 mg twice daily  -Gastroenterology consulted.  No intervention.  -Colonoscopy 6 months ago normal except for hemorrhoids  -Anusol suppositories twice daily for 14 days    Metabolic associated steatohepatitis  Overweight  -BMI 29.89 kg/m²  -Encourage weight loss  -Complicates all aspects of medical care    Hypothyroidism  -Continue Synthroid 1000 mcg every morning    Gastroesophageal reflux disease  -Protonix as above    Hypertension  -GDMT as above    Vitamin D deficiency  -50,000 units q. weekly    Anticipated Discharge Arrangements:   Home    PT/OT evals ordered?  Therapy evals ordered  Diet:  ADULT DIET; Easy to Chew  VTE prophylaxis: No VTE prophylaxis needed  Code status: Full Code    Non-peripheral Lines and Tubes (if present):          Telemetry (if present):  Cardiac/Telemetry Monitor On: No        Hospital Problems:  Principal Problem:    Lower GI bleeding  Active Problems:    Chronic cough    GERD (gastroesophageal reflux disease)    Acquired hypothyroidism    Diverticulosis of large intestine without perforation or abscess without bleeding    Mixed hyperlipidemia    TRACY (nonalcoholic steatohepatitis)    Primary hypertension    Type 2 diabetes mellitus without complication (HCC)    Abnormal CT of the chest    Pleural effusion    Hemorrhoids  Resolved Problems:    * No resolved hospital problems. *    Objective:   Patient Vitals for the past 24 hrs:   Temp Pulse Resp BP SpO2   01/05/25 0750 97.3 °F (36.3 °C) 94 18 136/70 98 %   01/04/25 1932 98.1 °F (36.7 °C) 81 18 (!) 153/59 96 %   01/04/25 1336 97.5 °F (36.4 °C) 88 18 (!) 153/66 94 %     Oxygen Therapy  SpO2: 98 %  Pulse via Oximetry: 91 beats per minute  O2 Device: Nasal cannula  O2 Flow Rate (L/min): 2 L/min    Estimated body mass index is 29.89 kg/m² as calculated from the following:    Height as of this encounter: 1.499 m (4' 11\").    Weight as of this encounter: 67.1 kg (148 lb).  No intake or output data in the 24 hours ending 01/05/25 1029

## 2025-01-05 NOTE — PROGRESS NOTES
Patient this am is complaining of profused back pain that radiate to the left side of her chest. She appears short of breath and complains she can't breath. I listened to her and she still wheezes bilaterally. Her respiration count is 18 bpm, on oxygen 2L NC. MD notified and advised stat breathing treatment. Will continue to monitor.

## 2025-01-05 NOTE — PLAN OF CARE
Problem: Chronic Conditions and Co-morbidities  Goal: Patient's chronic conditions and co-morbidity symptoms are monitored and maintained or improved  1/5/2025 0025 by Carli Clark RN  Outcome: Progressing  1/4/2025 1335 by Kayla Avalos RN  Outcome: Progressing     Problem: Discharge Planning  Goal: Discharge to home or other facility with appropriate resources  1/5/2025 0025 by Carli Clark RN  Outcome: Progressing  1/4/2025 1335 by Kayla Avalos RN  Outcome: Progressing     Problem: Safety - Adult  Goal: Free from fall injury  1/5/2025 0025 by Carli Clark RN  Outcome: Progressing  1/4/2025 1335 by Kayla Avalos RN  Outcome: Progressing

## 2025-01-05 NOTE — PROCEDURES
PROCEDURE NOTE  Date: 1/5/2025   Name: Poonam Luciano  YOB: 1950    Procedures was right side therapeutic thoracentesis  (assisted by Dr. Winters) with 1.6 L of yellow fluid removed.  Stat chest x-ray with improvement in right lung aeration.  No immediate complications noted.

## 2025-01-06 VITALS
BODY MASS INDEX: 29.84 KG/M2 | RESPIRATION RATE: 18 BRPM | OXYGEN SATURATION: 97 % | SYSTOLIC BLOOD PRESSURE: 104 MMHG | DIASTOLIC BLOOD PRESSURE: 52 MMHG | HEART RATE: 66 BPM | HEIGHT: 59 IN | TEMPERATURE: 98.1 F | WEIGHT: 148 LBS

## 2025-01-06 PROBLEM — I50.20 HFREF (HEART FAILURE WITH REDUCED EJECTION FRACTION) (HCC): Status: ACTIVE | Noted: 2025-01-06

## 2025-01-06 LAB
ANION GAP SERPL CALC-SCNC: 10 MMOL/L (ref 7–16)
BASOPHILS # BLD: 0 K/UL (ref 0–0.2)
BASOPHILS NFR BLD: 1 % (ref 0–2)
BUN SERPL-MCNC: 18 MG/DL (ref 8–23)
CALCIUM SERPL-MCNC: 8.7 MG/DL (ref 8.8–10.2)
CHLORIDE SERPL-SCNC: 103 MMOL/L (ref 98–107)
CO2 SERPL-SCNC: 25 MMOL/L (ref 20–29)
CREAT SERPL-MCNC: 0.87 MG/DL (ref 0.6–1.1)
DIFFERENTIAL METHOD BLD: ABNORMAL
EOSINOPHIL # BLD: 0 K/UL (ref 0–0.8)
EOSINOPHIL NFR BLD: 1 % (ref 0.5–7.8)
ERYTHROCYTE [DISTWIDTH] IN BLOOD BY AUTOMATED COUNT: 17.1 % (ref 11.9–14.6)
GLUCOSE BLD STRIP.AUTO-MCNC: 102 MG/DL (ref 65–100)
GLUCOSE BLD STRIP.AUTO-MCNC: 108 MG/DL (ref 65–100)
GLUCOSE SERPL-MCNC: 114 MG/DL (ref 70–99)
HCT VFR BLD AUTO: 27.2 % (ref 35.8–46.3)
HGB BLD-MCNC: 8.6 G/DL (ref 11.7–15.4)
IMM GRANULOCYTES # BLD AUTO: 0 K/UL (ref 0–0.5)
IMM GRANULOCYTES NFR BLD AUTO: 0 % (ref 0–5)
LYMPHOCYTES # BLD: 0.9 K/UL (ref 0.5–4.6)
LYMPHOCYTES NFR BLD: 28 % (ref 13–44)
MAGNESIUM SERPL-MCNC: 1.9 MG/DL (ref 1.8–2.4)
MCH RBC QN AUTO: 27.2 PG (ref 26.1–32.9)
MCHC RBC AUTO-ENTMCNC: 31.6 G/DL (ref 31.4–35)
MCV RBC AUTO: 86.1 FL (ref 82–102)
MONOCYTES # BLD: 0.3 K/UL (ref 0.1–1.3)
MONOCYTES NFR BLD: 10 % (ref 4–12)
NEUTS SEG # BLD: 2 K/UL (ref 1.7–8.2)
NEUTS SEG NFR BLD: 61 % (ref 43–78)
NRBC # BLD: 0 K/UL (ref 0–0.2)
PLATELET # BLD AUTO: 117 K/UL (ref 150–450)
PMV BLD AUTO: 9.9 FL (ref 9.4–12.3)
POTASSIUM SERPL-SCNC: 3.8 MMOL/L (ref 3.5–5.1)
RBC # BLD AUTO: 3.16 M/UL (ref 4.05–5.2)
SERVICE CMNT-IMP: ABNORMAL
SERVICE CMNT-IMP: ABNORMAL
SODIUM SERPL-SCNC: 138 MMOL/L (ref 136–145)
WBC # BLD AUTO: 3.3 K/UL (ref 4.3–11.1)

## 2025-01-06 PROCEDURE — 94761 N-INVAS EAR/PLS OXIMETRY MLT: CPT

## 2025-01-06 PROCEDURE — 6360000002 HC RX W HCPCS

## 2025-01-06 PROCEDURE — 82962 GLUCOSE BLOOD TEST: CPT

## 2025-01-06 PROCEDURE — 2500000003 HC RX 250 WO HCPCS: Performed by: INTERNAL MEDICINE

## 2025-01-06 PROCEDURE — 99223 1ST HOSP IP/OBS HIGH 75: CPT | Performed by: INTERNAL MEDICINE

## 2025-01-06 PROCEDURE — 36415 COLL VENOUS BLD VENIPUNCTURE: CPT

## 2025-01-06 PROCEDURE — 83735 ASSAY OF MAGNESIUM: CPT

## 2025-01-06 PROCEDURE — 85025 COMPLETE CBC W/AUTO DIFF WBC: CPT

## 2025-01-06 PROCEDURE — 6370000000 HC RX 637 (ALT 250 FOR IP): Performed by: INTERNAL MEDICINE

## 2025-01-06 PROCEDURE — 80048 BASIC METABOLIC PNL TOTAL CA: CPT

## 2025-01-06 PROCEDURE — 6370000000 HC RX 637 (ALT 250 FOR IP)

## 2025-01-06 RX ORDER — SPIRONOLACTONE 25 MG/1
25 TABLET ORAL DAILY
Status: CANCELLED | OUTPATIENT
Start: 2025-01-07

## 2025-01-06 RX ORDER — METOPROLOL SUCCINATE 25 MG/1
25 TABLET, EXTENDED RELEASE ORAL DAILY
Qty: 30 TABLET | Refills: 3 | Status: SHIPPED | OUTPATIENT
Start: 2025-01-07

## 2025-01-06 RX ORDER — ASPIRIN 81 MG/1
81 TABLET ORAL DAILY
Qty: 30 TABLET | Refills: 0 | Status: SHIPPED | OUTPATIENT
Start: 2025-01-06 | End: 2025-02-05

## 2025-01-06 RX ORDER — FUROSEMIDE 20 MG/1
20 TABLET ORAL DAILY PRN
Qty: 30 TABLET | Refills: 0 | Status: SHIPPED | OUTPATIENT
Start: 2025-01-06 | End: 2025-02-05

## 2025-01-06 RX ORDER — SPIRONOLACTONE 25 MG/1
25 TABLET ORAL DAILY
Qty: 30 TABLET | Refills: 3 | Status: SHIPPED | OUTPATIENT
Start: 2025-01-07

## 2025-01-06 RX ORDER — HYDROCORTISONE ACETATE 25 MG/1
25 SUPPOSITORY RECTAL 2 TIMES DAILY
Qty: 24 SUPPOSITORY | Refills: 0 | Status: SHIPPED | OUTPATIENT
Start: 2025-01-06 | End: 2025-01-18

## 2025-01-06 RX ADMIN — SACUBITRIL AND VALSARTAN 1 TABLET: 24; 26 TABLET, FILM COATED ORAL at 09:10

## 2025-01-06 RX ADMIN — ERGOCALCIFEROL 50000 UNITS: 1.25 CAPSULE ORAL at 09:22

## 2025-01-06 RX ADMIN — EMPAGLIFLOZIN 10 MG: 10 TABLET, FILM COATED ORAL at 09:11

## 2025-01-06 RX ADMIN — FUROSEMIDE 40 MG: 10 INJECTION, SOLUTION INTRAMUSCULAR; INTRAVENOUS at 09:10

## 2025-01-06 RX ADMIN — HYDROCORTISONE ACETATE 25 MG: 25 SUPPOSITORY RECTAL at 09:11

## 2025-01-06 RX ADMIN — LEVOTHYROXINE SODIUM 100 MCG: 0.1 TABLET ORAL at 05:39

## 2025-01-06 RX ADMIN — POTASSIUM CHLORIDE 20 MEQ: 1500 TABLET, EXTENDED RELEASE ORAL at 09:11

## 2025-01-06 RX ADMIN — METFORMIN HYDROCHLORIDE 1000 MG: 500 TABLET, EXTENDED RELEASE ORAL at 09:10

## 2025-01-06 RX ADMIN — GUAIFENESIN 1200 MG: 600 TABLET, EXTENDED RELEASE ORAL at 09:11

## 2025-01-06 RX ADMIN — SPIRONOLACTONE 25 MG: 25 TABLET ORAL at 09:11

## 2025-01-06 RX ADMIN — SODIUM CHLORIDE, PRESERVATIVE FREE 10 ML: 5 INJECTION INTRAVENOUS at 09:11

## 2025-01-06 RX ADMIN — CETIRIZINE HYDROCHLORIDE 10 MG: 10 TABLET, FILM COATED ORAL at 09:11

## 2025-01-06 RX ADMIN — METOPROLOL SUCCINATE 25 MG: 25 TABLET, EXTENDED RELEASE ORAL at 09:11

## 2025-01-06 RX ADMIN — PANTOPRAZOLE SODIUM 40 MG: 40 TABLET, DELAYED RELEASE ORAL at 05:39

## 2025-01-06 NOTE — PROGRESS NOTES
01/06/25 1050   Resting (Room Air)   SpO2 96   HR 86   During Walk (Room Air)   SpO2 94   HR 89   Walk/Assistance Device Ambulation   Rate of Dyspnea 0   Symptoms Other (comment)  (none)   After Walk   SpO2 96   HR 82   O2 Flow Rate (l/min) 0 l/min   FIO2 (%) 21   Rate of Dyspnea 0   Does the Patient Qualify for Home O2 No   Does the Patient Need Portable Oxygen Tanks No     Patient does not qualify for home oxygen

## 2025-01-06 NOTE — CARE COORDINATION
Patient with discharge orders for today.  Per walk test, patient does not qualify for home oxygen. No additional needs made known to CM. Patient has met all treatment goals and milestones for discharge. Family to provide transportation home. CM following until patient is discharged.        01/06/25 1241   Services At/After Discharge   Transition of Care Consult (CM Consult) N/A   Services At/After Discharge None   [place] Resource Information Provided? No   Mode of Transport at Discharge Other (see comment)  (Family)   Confirm Follow Up Transport Family   Condition of Participation: Discharge Planning   The Plan for Transition of Care is related to the following treatment goals: Patient to DC home and return to baseline level of function.   The Patient and/or Patient Representative was provided with a Choice of Provider? Patient   The Patient and/Or Patient Representative agree with the Discharge Plan? Yes   Freedom of Choice list was provided with basic dialogue that supports the patient's individualized plan of care/goals, treatment preferences, and shares the quality data associated with the providers?  Yes

## 2025-01-06 NOTE — CONSULTS
[facility] Cardiology Initial Cardiac Evaluation      Date of  Admission: 1/3/2025  5:33 PM     Primary Care Physician: Demetrius Almodovar MD  Primary Cardiologist: none  Referring Physician: Dr. Burleson  Supervising Physician: Dr. Valle    CC/Reason for evaluation: new onset CHF    HPI:  Poonam Luciano is a 74 y.o. female with prior medical history of HTN, T2DM, HLD, hypothyroidism, GERD, TRACY, and had recent R pleural effusion s/p thoracentesis with 1000ccs removed 12/9/2024. She presented to [facility] on 1/3 with BRBPR.    Workup in the ED revealed: Hgb 8.5, WBC 2.3, K 3.2, Cr 0.67, CXR w/ large R pleural effusion w/ adjacent airspace disease, worse from prior slightly. EKG in NSR with PACs, RBBB. The pt was admitted, GI was consulted and no procedure was recommended but she was started on suppositories for known internal hemorrhoids. Echo was performed and on 1/5 hospitalist performed R sided therapeutic thoracentesis with 1.6L of yellow fluid removed. CXR following with improving in size of effusion and pulmonology was consulted, felt ok for dc from pulmonology standpoint and effusion was felt to be 2/2 combination of TRACY and HFrEF (EF 35-40%). Given concern for new CHFE Cardiology was consulted for further recommendations, no prior Echos available for review.        Recent Cardiac Synopsis:  01/03/25 TTE  Interpretation Summary    Left Ventricle: Moderately reduced left ventricular systolic function with a visually estimated EF of 35 - 40%. Left ventricle size is normal. Mildly increased wall thickness. Findings consistent with mild concentric hypertrophy. Severe hypokinesis of the following segments: basal anterior, basal anterolateral, mid anterior, mid anterolateral and mid anteroseptal. Grade II diastolic dysfunction with increased LAP. Average E/e' ratio is 20.70.    Aortic Valve: Mild sclerosis of the aortic valve cusps.    Mitral Valve: Mild regurgitation.    Left Atrium: Left atrium is mildly

## 2025-01-06 NOTE — PROGRESS NOTES
Volume Index 50.2 mL/m2    LA Volume Index MOD A2C 38 (A) 16 - 34 ml/m2    LA Volume Index MOD A4C 39 (A) 16 - 34 ml/m2    LA Size Index 2.35 cm/m2    LA/AO Root Ratio 1.15     Ao Root Index 2.04 cm/m2    Ascending Aorta Index 1.85 cm/m2    AV Velocity Ratio 0.81     LVOT:AV VTI Index 0.78     TRISTIN/BSA VTI 1.5 cm2/m2    TRISTIN/BSA Peak Velocity 1.6 cm2/m2    MV:LVOT VTI Index 1.10     RVSP 18 mmHg   Protime-INR    Collection Time: 01/05/25 11:14 AM   Result Value Ref Range    Protime 14.8 11.3 - 14.9 sec    INR 1.1     POCT Glucose    Collection Time: 01/05/25 11:40 AM   Result Value Ref Range    POC Glucose 138 (H) 65 - 100 mg/dL    Performed by: BinuChinaPNRArley    POCT Glucose    Collection Time: 01/05/25  4:29 PM   Result Value Ref Range    POC Glucose 122 (H) 65 - 100 mg/dL    Performed by: Binu[employer]PNRArley    POCT Glucose    Collection Time: 01/05/25  8:40 PM   Result Value Ref Range    POC Glucose 166 (H) 65 - 100 mg/dL    Performed by: Delmar    CBC with Auto Differential    Collection Time: 01/06/25  6:14 AM   Result Value Ref Range    WBC 3.3 (L) 4.3 - 11.1 K/uL    RBC 3.16 (L) 4.05 - 5.2 M/uL    Hemoglobin 8.6 (L) 11.7 - 15.4 g/dL    Hematocrit 27.2 (L) 35.8 - 46.3 %    MCV 86.1 82.0 - 102.0 FL    MCH 27.2 26.1 - 32.9 PG    MCHC 31.6 31.4 - 35.0 g/dL    RDW 17.1 (H) 11.9 - 14.6 %    Platelets 117 (L) 150 - 450 K/uL    MPV 9.9 9.4 - 12.3 FL    nRBC 0.00 0.0 - 0.2 K/uL    Differential Type AUTOMATED      Neutrophils % 61 43 - 78 %    Lymphocytes % 28 13 - 44 %    Monocytes % 10 4.0 - 12.0 %    Eosinophils % 1 0.5 - 7.8 %    Basophils % 1 0.0 - 2.0 %    Immature Granulocytes % 0 0.0 - 5.0 %    Neutrophils Absolute 2.0 1.7 - 8.2 K/UL    Lymphocytes Absolute 0.9 0.5 - 4.6 K/UL    Monocytes Absolute 0.3 0.1 - 1.3 K/UL    Eosinophils Absolute 0.0 0.0 - 0.8 K/UL    Basophils Absolute 0.0 0.0 - 0.2 K/UL    Immature Granulocytes Absolute 0.0 0.0 - 0.5 K/UL       No results for input(s): \"COVID19\" in the last 72  hours.    Current Meds:  Current Facility-Administered Medications   Medication Dose Route Frequency    metoprolol succinate (TOPROL XL) extended release tablet 25 mg  25 mg Oral Daily    empagliflozin (JARDIANCE) tablet 10 mg  10 mg Oral Daily    spironolactone (ALDACTONE) tablet 25 mg  25 mg Oral Daily    furosemide (LASIX) injection 40 mg  40 mg IntraVENous Daily    sacubitril-valsartan (ENTRESTO) 24-26 MG per tablet 1 tablet  1 tablet Oral Daily    hydrocortisone (ANUSOL-HC) suppository 25 mg  25 mg Rectal BID    melatonin tablet 6 mg  6 mg Oral Nightly PRN    guaiFENesin (MUCINEX) extended release tablet 1,200 mg  1,200 mg Oral BID    cetirizine (ZYRTEC) tablet 10 mg  10 mg Oral Daily    fluticasone (FLONASE) 50 MCG/ACT nasal spray 2 spray  2 spray Nasal Daily PRN    levothyroxine (SYNTHROID) tablet 100 mcg  100 mcg Oral Daily    metFORMIN (GLUCOPHAGE-XR) extended release tablet 1,000 mg  1,000 mg Oral Daily with breakfast    pantoprazole (PROTONIX) tablet 40 mg  40 mg Oral BID AC    potassium chloride (KLOR-CON M) extended release tablet 20 mEq  20 mEq Oral Daily    vitamin D (ERGOCALCIFEROL) capsule 50,000 Units  50,000 Units Oral Q7 Days    insulin lispro (HUMALOG,ADMELOG) injection vial 0-10 Units  0-10 Units SubCUTAneous 4x Daily AC & HS    glucose chewable tablet 16 g  4 tablet Oral PRN    dextrose bolus 10% 125 mL  125 mL IntraVENous PRN    Or    dextrose bolus 10% 250 mL  250 mL IntraVENous PRN    Glucagon Emergency KIT 1 mg  1 mg SubCUTAneous PRN    dextrose 10 % infusion   IntraVENous Continuous PRN    albuterol (PROVENTIL) (2.5 MG/3ML) 0.083% nebulizer solution 2.5 mg  2.5 mg Nebulization Q6H PRN    sodium chloride flush 0.9 % injection 5-40 mL  5-40 mL IntraVENous 2 times per day    sodium chloride flush 0.9 % injection 5-40 mL  5-40 mL IntraVENous PRN    0.9 % sodium chloride infusion   IntraVENous PRN    potassium chloride (KLOR-CON M) extended release tablet 40 mEq  40 mEq Oral PRN    Or

## 2025-01-06 NOTE — CONSULTS
PULMONARY/CRITICAL CARE CONSULT NOTE           1/6/2025    Poonammargarette Luciano                        Date of Admission:  1/3/2025    The patient's chart is reviewed and the patient is discussed with the staff.    Subjective:     Patient is a 74 y.o.  female seen and evaluated at the request of Dr. Burleson.    She has a history of chronic cough, seen in pulmonary office in December. CT scan with early ILD changes (PFT 7/16/24 ratio 70%, FEV1 76%, FVC 83%, TLC 78%, DLCO 66%) and calcified lymph nodes with pleural effusion.   Plan at that time was to set her up for outpt thoracentesis.   R thora 12/9/24 with 1000cc of fluid revealing transudate.   She also has a history of DM2, HTN, TRACY, HLD, GERD    She was admitted 1/3/25 with BRBPR. During admission CXR once again showed R sided effusion similar to appearance prior to thora in December.   She has been seen by GI and with a history of internal hemorrhoids and no further bleeding plan was for outpt GI follow up.     TTE 1/5/25 showed EF 35-40% with mild MR.     Hospitalist performed R thoracentesis 1/5 with 1.6L fluid removed. Post procedure CXR showed improvement in size of effusion but not resolution.   Today she states   She is on 1L O2 with sats 99%.   She was started on 40mg IV lasix daily 1/6.   No strict I/O recorded.     She states she felt good for about 2 weeks after her last thoracentesis but then more recently just became very short of breath and fatigued. She states she has lasix at home but has not been taking regularly due to frequency of urination.   She feels much better today after thoracentesis yesterday. She has her O2 off currently satting 94%.         Review of Systems: Comprehensive ROS negative except in HPI    Current Outpatient Medications   Medication Instructions    albuterol sulfate HFA (PROVENTIL;VENTOLIN;PROAIR) 108 (90 Base) MCG/ACT inhaler 2 puffs, Inhalation, EVERY 6 HOURS PRN    famotidine (PEPCID) 20 mg,  sounds.  Musculoskeletal: warm without cyanosis. Normal muscle tone.   Skin:  no jaundice or rashes, no wounds   Neurologic: symmetric strength, fluent speech  Psychiatric:  calm, appropriate, oriented x 4    Imaging: I performed an independent interpretation of the patient's images.  CXR:  1/3/25      CT Chest: 7/16/24      Recent Labs     01/03/25  1758 01/04/25  1616 01/05/25  0537 01/05/25  1114 01/06/25  0614   WBC 2.3*  --  3.4*  --  3.3*   HGB 8.5* 8.6* 9.0*  --  8.6*   HCT 27.1* 27.0* 28.3*  --  27.2*     --  120*  --  117*   INR  --   --   --  1.1  --      --  137  --  138   K 3.2*  --  3.8  --  3.8     --  102  --  103   CO2 25  --  23  --  25   BUN 11  --  13  --  18   CREATININE 0.67  --  0.72  --  0.87   MG 2.0  --   --   --  1.9   BILITOT 0.9  --   --   --   --    AST 46*  --   --   --   --    ALT 24  --   --   --   --    ALKPHOS 120*  --   --   --   --      ECHO: 01/03/25    ECHO (TTE) COMPLETE (PRN CONTRAST/BUBBLE/STRAIN/3D) 01/05/2025 12:41 PM (Final)    Interpretation Summary    Left Ventricle: Moderately reduced left ventricular systolic function with a visually estimated EF of 35 - 40%. Left ventricle size is normal. Mildly increased wall thickness. Findings consistent with mild concentric hypertrophy. Severe hypokinesis of the following segments: basal anterior, basal anterolateral, mid anterior, mid anterolateral and mid anteroseptal. Grade II diastolic dysfunction with increased LAP. Average E/e' ratio is 20.70.    Aortic Valve: Mild sclerosis of the aortic valve cusps.    Mitral Valve: Mild regurgitation.    Left Atrium: Left atrium is mildly dilated. Left atrial volume index is mildly increased (35-41 mL/m2).    Signed by: Terry James DO on 1/5/2025 12:41 PM    MICRO: No results for input(s): \"CULTURE\" in the last 72 hours.  No results for input(s): \"COVID19\" in the last 72 hours.  Assessment and Plan:  (Medical Decision Making)   Principal Problem:    Lower GI

## 2025-01-06 NOTE — DISCHARGE SUMMARY
Hospitalist Discharge Summary   Admit Date:  1/3/2025  5:33 PM   DC Note date: 2025  Name:  Poonam Luciano   Age:  74 y.o.  Sex:  female  :  1950   MRN:  [id number]   Room:  [facility] [place]  PCP:  Demetrius Almodovar MD    Presenting Complaint: Shortness of Breath     Initial Admission Diagnosis: Rectal bleeding [K62.5]  Lower GI bleeding [K92.2]  MANCINI (dyspnea on exertion) [R06.09]  Pleural effusion [J90]     Problem List for this Hospitalization (present on admission):    Principal Problem:    Lower GI bleeding  Active Problems:    Chronic cough    GERD (gastroesophageal reflux disease)    Acquired hypothyroidism    Diverticulosis of large intestine without perforation or abscess without bleeding    Mixed hyperlipidemia    TRACY (nonalcoholic steatohepatitis)    Primary hypertension    Type 2 diabetes mellitus without complication (HCC)    Abnormal CT of the chest    Pleural effusion    Hemorrhoids    HFrEF (heart failure with reduced ejection fraction) (HCC)  Resolved Problems:    * No resolved hospital problems. *      Hospital Course:    Poonam Luciano is a 74 y.o. female with medical history of type 2 diabetes mellitus, hypertension, TRACY, hyperlipidemia, hypothyroidism, GERD, and recent right pleural effusion status postthoracentesis who presents to the ED with shortness of breath and bright red blood per rectum.  Admitted for acute blood loss anemia in setting of possible lower GI bleed and shortness of breath secondary to large right pleural effusion.  Echocardiography EF 35 to 40%.  Left atrium mildly dilated. Pulmonology consulted.  Gastroenterology consulted. I would not anticipate any intervention given recent colonoscopy 6 months ago was unremarkable except for some internal hemorrhoids.     I have started you on new medications for your new onset heart failure.  Please take as prescribed.  Also, a referral has been placed with cardiology.  Please give them a call in 1 to 2 days to  [phone]  Last data filed at 1/5/2025 2040  Gross per 24 hour   Intake --   Output 1 ml   Net -1 ml         Physical Exam:    General:    Well nourished.  No overt distress  Head:  Normocephalic, atraumatic  Eyes:  Sclerae appear normal.  Pupils equally round.    HENT:  Nares appear normal, no drainage.  Moist mucous membranes  Neck:  No restricted ROM.  Trachea midline  CV:   RRR.  No m/r/g.  No JVD  Lungs:   CTAB.  No wheezing, rhonchi, or rales.  Respirations even, unlabored  Abdomen:   Soft, nontender, nondistended.    Extremities: Warm and dry.   No edema.    Skin:     No rashes.  Normal coloration  Neuro:  CN II-XII grossly intact.  Psych:  Normal mood and affect.      Signed:  Samuel Burleson MD    Part of this note may have been written by using a voice dictation software.  The note has been proof read but may still contain some grammatical/other typographical errors.

## 2025-01-07 LAB — PATH REV BLD -IMP: NORMAL

## 2025-01-08 ENCOUNTER — TELEPHONE (OUTPATIENT)
Dept: PULMONOLOGY | Age: 75
End: 2025-01-08

## 2025-01-08 DIAGNOSIS — J90 PLEURAL EFFUSION: Primary | ICD-10-CM

## 2025-01-08 DIAGNOSIS — R06.02 SOB (SHORTNESS OF BREATH): ICD-10-CM

## 2025-01-08 NOTE — TELEPHONE ENCOUNTER
Gasper Doll MD  [facility] Triage  Please contact patient and arrange a MINS: 40 minute  14 day TCM Follow up with CXR prior to appointment.        Thank you,  Gasper Doll MD    D/C 2025    Care Transitions Initial Follow Up Call    Outreach made within 2 business days of discharge: Yes    Patient: Poonam Luciano   Patient : 1950   MRN: [id number]    Reason for Admission: Shortness of Breath   Discharge Date: 25       Spoke with: patient    Discharge department/facility: home    TCM Interactive Patient Contact:  Was patient able to fill all prescriptions: Yes  Was patient instructed to bring all medications to the follow-up visit: Yes  Is patient taking all medications as directed in the discharge summary? Yes  Does patient understand their discharge instructions: Yes  Does patient have questions or concerns that need addressed prior to 7-14 day follow up office visit: no    Additional needs identified to be addressed with provider  No needs identified             Scheduled appointment with [facility] within 7-14 days    Follow Up  Future Appointments   Date Time Provider Department Center   1/10/2025  2:30 PM Campbell Valle MD [facility] AMB   2025  8:00 AM Dipika Lanier, APRN - NP [facility] AMB   2025  1:20 PM Amanda Trevino MD [facility] AMB   3/10/2025  8:40 AM Dipika Lanier, APRN - NP [facility] AMB       Mi Brasher RCP

## 2025-01-08 NOTE — PROGRESS NOTES
Physician Progress Note      PATIENT:               RENETTA CHANDLER  CSN #:                  [id number]  :                       1950  ADMIT DATE:       1/3/2025 5:33 PM  DISCH DATE:        2025 1:52 PM  RESPONDING  PROVIDER #:        Samuel Burleson MD          QUERY TEXT:    Patient admitted with SOB. Noted documentation of acute respiratory failure in    PN by IM. In order to support the diagnosis of acute respiratory failure,   please include additional clinical indicators in your documentation.  Or   please document if the diagnosis of acute respiratory failure has been ruled   out after further study.    The medical record reflects the following:  Risk Factors: 74 yr old, CHF  Clinical Indicators: resp 22,20, oxygen 2LNC for short time, MANCINI, unlabored   breathing, regular respiratory pattern  Treatment: Pulmonary conuslt, lab monitoring, imaging, oxygen  Options provided:  -- Acute Respiratory Failure as evidenced by, Please document evidence.  -- Acute Respiratory Failure ruled out after study  -- Other - I will add my own diagnosis  -- Disagree - Not applicable / Not valid  -- Disagree - Clinically unable to determine / Unknown  -- Refer to Clinical Documentation Reviewer    PROVIDER RESPONSE TEXT:    This patient is in acute respiratory failure as evidenced by copd    Query created by: Lilliam Hammer on 2025 10:41 AM      QUERY TEXT:    Pt admitted with SOB and has CHF documented. If possible, please document in   progress notes and discharge summary further specificity regarding the type   and acuity of CHF:    The medical record reflects the following:  Risk Factors: 74 yr old, CHF  Clinical Indicators: BNP 1,993, EF 35 to 40%, Documented-Heart failure reduced   ejection fraction  Treatment: lab monitoring, Lasix IV, I & Os  Options provided:  -- Acute on Chronic Systolic CHF/HFrEF  -- Acute Systolic CHF/HFrEF  -- Chronic Systolic CHF/HFrEF  -- Other - I will add my own diagnosis  --

## 2025-01-15 ENCOUNTER — INITIAL CONSULT (OUTPATIENT)
Age: 75
End: 2025-01-15
Payer: MEDICARE

## 2025-01-15 VITALS
HEIGHT: 59 IN | HEART RATE: 68 BPM | WEIGHT: 145.9 LBS | BODY MASS INDEX: 29.41 KG/M2 | SYSTOLIC BLOOD PRESSURE: 138 MMHG | DIASTOLIC BLOOD PRESSURE: 70 MMHG

## 2025-01-15 DIAGNOSIS — I50.20 HFREF (HEART FAILURE WITH REDUCED EJECTION FRACTION) (HCC): Primary | ICD-10-CM

## 2025-01-15 DIAGNOSIS — I20.9 ANGINA, CLASS III (HCC): ICD-10-CM

## 2025-01-15 PROCEDURE — 1160F RVW MEDS BY RX/DR IN RCRD: CPT | Performed by: INTERNAL MEDICINE

## 2025-01-15 PROCEDURE — 1036F TOBACCO NON-USER: CPT | Performed by: INTERNAL MEDICINE

## 2025-01-15 PROCEDURE — 1159F MED LIST DOCD IN RCRD: CPT | Performed by: INTERNAL MEDICINE

## 2025-01-15 PROCEDURE — 1126F AMNT PAIN NOTED NONE PRSNT: CPT | Performed by: INTERNAL MEDICINE

## 2025-01-15 PROCEDURE — 1090F PRES/ABSN URINE INCON ASSESS: CPT | Performed by: INTERNAL MEDICINE

## 2025-01-15 PROCEDURE — 3075F SYST BP GE 130 - 139MM HG: CPT | Performed by: INTERNAL MEDICINE

## 2025-01-15 PROCEDURE — 3078F DIAST BP <80 MM HG: CPT | Performed by: INTERNAL MEDICINE

## 2025-01-15 PROCEDURE — 99204 OFFICE O/P NEW MOD 45 MIN: CPT | Performed by: INTERNAL MEDICINE

## 2025-01-15 PROCEDURE — G8399 PT W/DXA RESULTS DOCUMENT: HCPCS | Performed by: INTERNAL MEDICINE

## 2025-01-15 PROCEDURE — 1123F ACP DISCUSS/DSCN MKR DOCD: CPT | Performed by: INTERNAL MEDICINE

## 2025-01-15 PROCEDURE — G8417 CALC BMI ABV UP PARAM F/U: HCPCS | Performed by: INTERNAL MEDICINE

## 2025-01-15 PROCEDURE — G8427 DOCREV CUR MEDS BY ELIG CLIN: HCPCS | Performed by: INTERNAL MEDICINE

## 2025-01-15 PROCEDURE — 3017F COLORECTAL CA SCREEN DOC REV: CPT | Performed by: INTERNAL MEDICINE

## 2025-01-15 PROCEDURE — 1111F DSCHRG MED/CURRENT MED MERGE: CPT | Performed by: INTERNAL MEDICINE

## 2025-01-15 RX ORDER — SODIUM CHLORIDE 9 MG/ML
INJECTION, SOLUTION INTRAVENOUS CONTINUOUS
OUTPATIENT
Start: 2025-01-15

## 2025-01-15 RX ORDER — POTASSIUM CHLORIDE 1500 MG/1
20 TABLET, EXTENDED RELEASE ORAL DAILY
COMMUNITY
Start: 2024-11-05

## 2025-01-15 RX ORDER — ASPIRIN 325 MG
325 TABLET ORAL ONCE
OUTPATIENT
Start: 2025-01-15 | End: 2025-01-15

## 2025-01-15 RX ORDER — SODIUM CHLORIDE 9 MG/ML
INJECTION, SOLUTION INTRAVENOUS PRN
OUTPATIENT
Start: 2025-01-15

## 2025-01-15 RX ORDER — LORAZEPAM 0.5 MG/1
0.5 TABLET ORAL
OUTPATIENT
Start: 2025-01-15 | End: 2025-01-16

## 2025-01-15 RX ORDER — SODIUM CHLORIDE 0.9 % (FLUSH) 0.9 %
5-40 SYRINGE (ML) INJECTION EVERY 12 HOURS SCHEDULED
OUTPATIENT
Start: 2025-01-15

## 2025-01-15 RX ORDER — SODIUM CHLORIDE 0.9 % (FLUSH) 0.9 %
5-40 SYRINGE (ML) INJECTION PRN
OUTPATIENT
Start: 2025-01-15

## 2025-01-15 ASSESSMENT — ENCOUNTER SYMPTOMS
SHORTNESS OF BREATH: 0
ABDOMINAL PAIN: 0

## 2025-01-15 NOTE — PROGRESS NOTES
[facility] CARDIOLOGY  [address] [place] [address], [facility] [address]  [place] [address]  PHONE: [phone]      01/15/25    NAME:  Poonam Luciano  : 1950  MRN: [id number]         SUBJECTIVE:   Poonam Luciano is a 74 y.o. female seen for a consultation visit regarding the following:     Chief Complaint   Patient presents with    Follow-Up from Hospital            HPI:  Consultation is requested by Demetrius Almodovar MD for evaluation of Follow-Up from Hospital   .    Ms. Salas presents today for hospital follow-up.  She was recently hospitalized with shortness of breath.  Found to have large pleural effusion requiring thoracentesis.  Echo in the hospital showed reduced ejection fraction of 35 to 40% with regional wall motion abnormalities.  She was started on guideline directed medical therapy and discharged home.  She has not had any heart trouble previously.  Reports since discharge she has been feeling better.  No chest pain, orthopnea, PND.  No syncope.  She has had some edema of her lower extremities.  Current medications include spironolactone, metoprolol, Lasix and Entresto.  She is a non-smoker.        Cardiac Medications       Potassium Sparing Diuretics       spironolactone (ALDACTONE) 25 MG tablet Take 1 tablet by mouth daily       Beta Blockers Cardio-Selective       metoprolol succinate (TOPROL XL) 25 MG extended release tablet Take 1 tablet by mouth daily       Loop Diuretics       furosemide (LASIX) 20 MG tablet Take 1 tablet by mouth daily as needed (Weight gain of 2 pounds, shortness of breath, lower extremity swelling.)       Cardiovascular Agents Misc. - Combinations       sacubitril-valsartan (ENTRESTO) 24-26 MG per tablet Take 1 tablet by mouth daily       Salicylates       aspirin 81 MG EC tablet Take 1 tablet by mouth daily          Diabetic Medications       Biguanides       metFORMIN (GLUCOPHAGE-XR) 500 MG extended release tablet Take 1 tablet by mouth       Sodium-Glucose

## 2025-01-16 ENCOUNTER — OFFICE VISIT (OUTPATIENT)
Dept: PULMONOLOGY | Age: 75
End: 2025-01-16

## 2025-01-16 ENCOUNTER — HOSPITAL ENCOUNTER (OUTPATIENT)
Dept: GENERAL RADIOLOGY | Age: 75
Discharge: HOME | End: 2025-01-19
Payer: MEDICARE

## 2025-01-16 VITALS
WEIGHT: 141 LBS | DIASTOLIC BLOOD PRESSURE: 72 MMHG | OXYGEN SATURATION: 96 % | HEART RATE: 57 BPM | TEMPERATURE: 97.9 F | BODY MASS INDEX: 28.43 KG/M2 | RESPIRATION RATE: 17 BRPM | HEIGHT: 59 IN | SYSTOLIC BLOOD PRESSURE: 142 MMHG

## 2025-01-16 DIAGNOSIS — R06.02 SOB (SHORTNESS OF BREATH): ICD-10-CM

## 2025-01-16 DIAGNOSIS — J90 PLEURAL EFFUSION: ICD-10-CM

## 2025-01-16 DIAGNOSIS — J84.9 ILD (INTERSTITIAL LUNG DISEASE) (HCC): ICD-10-CM

## 2025-01-16 DIAGNOSIS — R05.3 CHRONIC COUGH: ICD-10-CM

## 2025-01-16 DIAGNOSIS — Z09 HOSPITAL DISCHARGE FOLLOW-UP: Primary | ICD-10-CM

## 2025-01-16 PROCEDURE — 71046 X-RAY EXAM CHEST 2 VIEWS: CPT

## 2025-01-16 RX ORDER — CHLOPHEDIANOL HCL AND PYRILAMINE MALEATE 12.5; 12.5 MG/5ML; MG/5ML
SOLUTION ORAL
Qty: 250 ML | Refills: 2 | Status: SHIPPED | OUTPATIENT
Start: 2025-01-16

## 2025-01-16 NOTE — PATIENT INSTRUCTIONS
Please try lying flat Sunday night and call us on Monday morning if you have any increase in shortness of breath, excessive coughing, or just unable to get comfortable in that position. If you feel smothered at all lying flat we will need to set you up for repeat thoracentesis before your heart cath on Wednesday morning.

## 2025-01-16 NOTE — H&P (VIEW-ONLY)
Name:  Poonam Luciano  YOB: 1950   MRN: [id number]      Office Visit: 1/16/2025        ASSESSMENT AND PLAN:  (Medical Decision Making)    Impression: 74 y.o. female here to follow up on recurrent pleural effusion on right, post hospitalization in January.  She has a history of portal hypertension, TRACY followed by [facility], and HFrEF.     1. Hospital discharge follow-up  2. Pleural effusion  Pleural effusion reoccurring on the right due to cirrhosis and HFrEF.  Set up to have heart cath by cardiology next week.  May need therapeutic thoracentesis before procedure in order to lay flat.  Could set this up for Tuesday if needed.    - DE DISCHARGE MEDS RECONCILED W/ CURRENT OUTPATIENT MED LIST    3. ILD (interstitial lung disease) (HCC)  With chronic cough and early ILD changes as well as positive CARLITOS, she is seeing rheumatology at the end of this month. Recurrent pleural effusion also contributing to cough.     4. SOB (shortness of breath)  Multifactorial with cirrhosis, heart failure, ILD changes, and pleural effusion. Proceed with cardiac workup as planned by cardiology     5. Chronic cough  Multifactorial with ILD changes and recurrent pleural effusion. Cough is worse when right pleural effusion re accumulates. May need another thoracentesis soon as outlined above. Will send prescription for Ninjacof for symptomatic relief. She also has a prescription for hycodan by PCP but is cautious of taking this.     No orders of the defined types were placed in this encounter.        Procedures    DE DISCHARGE MEDS RECONCILED W/ CURRENT OUTPATIENT MED LIST     Follow-up and Dispositions    Return in about 4 months (around 5/16/2025) for Dipika, dyspnea, cough; effusion.       Dipika Lanier APRN - NP    Collaborating physician is Audrey Saab MD  __________________________________________________________    HISTORY OF PRESENT ILLNESS:    Ms. Poonam Luciano is a 74 y.o. female who is seen at

## 2025-01-16 NOTE — PROGRESS NOTES
Name:  Poonam Luciano  YOB: 1950   MRN: [id number]      Office Visit: 1/16/2025        ASSESSMENT AND PLAN:  (Medical Decision Making)    Impression: 74 y.o. female here to follow up on recurrent pleural effusion on right, post hospitalization in January.  She has a history of portal hypertension, TRACY followed by [facility], and HFrEF.     1. Hospital discharge follow-up  2. Pleural effusion  Pleural effusion reoccurring on the right due to cirrhosis and HFrEF.  Set up to have heart cath by cardiology next week.  May need therapeutic thoracentesis before procedure in order to lay flat.  Could set this up for Tuesday if needed.    - NM DISCHARGE MEDS RECONCILED W/ CURRENT OUTPATIENT MED LIST    3. ILD (interstitial lung disease) (HCC)  With chronic cough and early ILD changes as well as positive CARLITOS, she is seeing rheumatology at the end of this month. Recurrent pleural effusion also contributing to cough.     4. SOB (shortness of breath)  Multifactorial with cirrhosis, heart failure, ILD changes, and pleural effusion. Proceed with cardiac workup as planned by cardiology     5. Chronic cough  Multifactorial with ILD changes and recurrent pleural effusion. Cough is worse when right pleural effusion re accumulates. May need another thoracentesis soon as outlined above. Will send prescription for Ninjacof for symptomatic relief. She also has a prescription for hycodan by PCP but is cautious of taking this.     No orders of the defined types were placed in this encounter.        Procedures    NM DISCHARGE MEDS RECONCILED W/ CURRENT OUTPATIENT MED LIST     Follow-up and Dispositions    Return in about 4 months (around 5/16/2025) for Dipika, dyspnea, cough; effusion.       Dipika Lanier APRN - NP    Collaborating physician is Audrey Saab MD  __________________________________________________________    HISTORY OF PRESENT ILLNESS:    Ms. Poonam Luciano is a 74 y.o. female who is seen at

## 2025-01-20 ENCOUNTER — TELEPHONE (OUTPATIENT)
Dept: PULMONOLOGY | Age: 75
End: 2025-01-20

## 2025-01-20 ENCOUNTER — PREP FOR PROCEDURE (OUTPATIENT)
Dept: PULMONOLOGY | Age: 75
End: 2025-01-20

## 2025-01-20 NOTE — TELEPHONE ENCOUNTER
Contacted patient and set up for thoracentesis 1/21 @ 11am.  10:00 arrival at [facility].

## 2025-01-20 NOTE — TELEPHONE ENCOUNTER
Having increased SOB.  Needs to have fluid drained from lung tomorrow.  Having heart cath on Wednesday

## 2025-01-21 ENCOUNTER — HOSPITAL ENCOUNTER (OUTPATIENT)
Age: 75
Discharge: HOME | End: 2025-01-21
Attending: STUDENT IN AN ORGANIZED HEALTH CARE EDUCATION/TRAINING PROGRAM | Admitting: STUDENT IN AN ORGANIZED HEALTH CARE EDUCATION/TRAINING PROGRAM
Payer: MEDICARE

## 2025-01-21 VITALS
BODY MASS INDEX: 29.03 KG/M2 | HEIGHT: 59 IN | WEIGHT: 144 LBS | HEART RATE: 74 BPM | OXYGEN SATURATION: 97 % | TEMPERATURE: 98.5 F

## 2025-01-21 DIAGNOSIS — I50.20 HFREF (HEART FAILURE WITH REDUCED EJECTION FRACTION) (HCC): ICD-10-CM

## 2025-01-21 DIAGNOSIS — J90 PLEURAL EFFUSION: ICD-10-CM

## 2025-01-21 LAB
ANION GAP SERPL CALC-SCNC: 8 MMOL/L (ref 7–16)
APPEARANCE FLD: NORMAL
BASOPHILS # BLD: 0.02 K/UL (ref 0–0.2)
BASOPHILS NFR BLD: 0.6 % (ref 0–2)
BUN SERPL-MCNC: 11 MG/DL (ref 8–23)
CALCIUM SERPL-MCNC: 8.6 MG/DL (ref 8.8–10.2)
CHLORIDE SERPL-SCNC: 105 MMOL/L (ref 98–107)
CO2 SERPL-SCNC: 25 MMOL/L (ref 20–29)
COLOR FLD: YELLOW
CREAT SERPL-MCNC: 0.89 MG/DL (ref 0.6–1.1)
DIFFERENTIAL METHOD BLD: ABNORMAL
EOSINOPHIL # BLD: 0.12 K/UL (ref 0–0.8)
EOSINOPHIL NFR BLD: 3.8 % (ref 0.5–7.8)
ERYTHROCYTE [DISTWIDTH] IN BLOOD BY AUTOMATED COUNT: 17 % (ref 11.9–14.6)
GLUCOSE FLD-MCNC: 198 MG/DL
GLUCOSE SERPL-MCNC: 211 MG/DL (ref 70–99)
HCT VFR BLD AUTO: 32.7 % (ref 35.8–46.3)
HGB BLD-MCNC: 9.9 G/DL (ref 11.7–15.4)
IMM GRANULOCYTES # BLD AUTO: 0.01 K/UL (ref 0–0.5)
IMM GRANULOCYTES NFR BLD AUTO: 0.3 % (ref 0–5)
LDH FLD L TO P-CCNC: 128 U/L
LYMPHOCYTES # BLD: 1.06 K/UL (ref 0.5–4.6)
LYMPHOCYTES NFR BLD: 33.8 % (ref 13–44)
LYMPHOCYTES NFR BRONCH MANUAL: 90 %
MACROPHAGES NFR BRONCH MANUAL: 10 %
MCH RBC QN AUTO: 26.7 PG (ref 26.1–32.9)
MCHC RBC AUTO-ENTMCNC: 30.3 G/DL (ref 31.4–35)
MCV RBC AUTO: 88.1 FL (ref 82–102)
MONOCYTES # BLD: 0.37 K/UL (ref 0.1–1.3)
MONOCYTES NFR BLD: 11.8 % (ref 4–12)
NEUTS SEG # BLD: 1.56 K/UL (ref 1.7–8.2)
NEUTS SEG NFR BLD: 49.7 % (ref 43–78)
NRBC # BLD: 0 K/UL (ref 0–0.2)
NUC CELL # FLD: 514 /CU MM
OTHER CELLS NFR BLD MANUAL: 2
PLATELET # BLD AUTO: 144 K/UL (ref 150–450)
PMV BLD AUTO: 11 FL (ref 9.4–12.3)
POTASSIUM SERPL-SCNC: 3.8 MMOL/L (ref 3.5–5.1)
PROT FLD-MCNC: 2.8 G/DL
RBC # BLD AUTO: 3.71 M/UL (ref 4.05–5.2)
RBC # FLD: 2000 /CU MM
SODIUM SERPL-SCNC: 139 MMOL/L (ref 136–145)
SPECIMEN SOURCE FLD: NORMAL
WBC # BLD AUTO: 3.1 K/UL (ref 4.3–11.1)

## 2025-01-21 PROCEDURE — 87070 CULTURE OTHR SPECIMN AEROBIC: CPT

## 2025-01-21 PROCEDURE — 3609027000 HC THORACENTESIS W/ULTRASOUND: Performed by: STUDENT IN AN ORGANIZED HEALTH CARE EDUCATION/TRAINING PROGRAM

## 2025-01-21 PROCEDURE — 32555 ASPIRATE PLEURA W/ IMAGING: CPT | Performed by: STUDENT IN AN ORGANIZED HEALTH CARE EDUCATION/TRAINING PROGRAM

## 2025-01-21 PROCEDURE — 88305 TISSUE EXAM BY PATHOLOGIST: CPT

## 2025-01-21 PROCEDURE — 83615 LACTATE (LD) (LDH) ENZYME: CPT

## 2025-01-21 PROCEDURE — 89050 BODY FLUID CELL COUNT: CPT

## 2025-01-21 PROCEDURE — 82945 GLUCOSE OTHER FLUID: CPT

## 2025-01-21 PROCEDURE — 88112 CYTOPATH CELL ENHANCE TECH: CPT

## 2025-01-21 PROCEDURE — 84157 ASSAY OF PROTEIN OTHER: CPT

## 2025-01-21 PROCEDURE — C1729 CATH, DRAINAGE: HCPCS | Performed by: STUDENT IN AN ORGANIZED HEALTH CARE EDUCATION/TRAINING PROGRAM

## 2025-01-21 PROCEDURE — 2709999900 HC NON-CHARGEABLE SUPPLY: Performed by: STUDENT IN AN ORGANIZED HEALTH CARE EDUCATION/TRAINING PROGRAM

## 2025-01-21 PROCEDURE — 87205 SMEAR GRAM STAIN: CPT

## 2025-01-21 PROCEDURE — 84311 SPECTROPHOTOMETRY: CPT

## 2025-01-21 RX ORDER — SODIUM CHLORIDE 9 MG/ML
INJECTION, SOLUTION INTRAVENOUS PRN
Status: CANCELLED | OUTPATIENT
Start: 2025-01-21

## 2025-01-21 RX ORDER — SODIUM CHLORIDE 0.9 % (FLUSH) 0.9 %
5-40 SYRINGE (ML) INJECTION PRN
Status: CANCELLED | OUTPATIENT
Start: 2025-01-21

## 2025-01-21 RX ORDER — SODIUM CHLORIDE 0.9 % (FLUSH) 0.9 %
5-40 SYRINGE (ML) INJECTION EVERY 12 HOURS SCHEDULED
Status: CANCELLED | OUTPATIENT
Start: 2025-01-21

## 2025-01-21 ASSESSMENT — PAIN - FUNCTIONAL ASSESSMENT: PAIN_FUNCTIONAL_ASSESSMENT: NONE - DENIES PAIN

## 2025-01-21 NOTE — PROGRESS NOTES
Patient pre-assessment complete for [facility] scheduled for 930, arrival time 0730. Patient verified using . Patient instructed to bring a list of all home medications on the day of procedure. NPO status reinforced. Patient informed to take a full dose aspirin 325mg  or 81 mg x 4 on the day of procedure. Patient instructed to HOLD jardiance, entresto, lasix, aldactone, metformin. Instructed they can take all other medications excluding vitamins & supplements. Patient verbalizes understanding of all instructions & denies any questions at this time.    Pt instructed to drink 4-6 cups of water prior to arrival for procedure.

## 2025-01-21 NOTE — INTERVAL H&P NOTE
Update History & Physical    The patient's History and Physical of January 16, 2025 was reviewed with the patient and I examined the patient. There was no change. The surgical site was confirmed by the patient and me.     Plan: The risks, benefits, expected outcome, and alternative to the recommended procedure have been discussed with the patient. Patient understands and wants to proceed with the procedure.     Electronically signed by Juan C Pappas MD on 1/21/2025 at 10:19 AM

## 2025-01-21 NOTE — PROCEDURES
PROCEDURE NOTE  Date: 1/21/2025   Name: Poonam Luciano  YOB: 1950    Procedures    PROCEDURE: DIAGNOSTIC THORACENTESIS (CPT 11071)  and THERAPEUTIC THORACENTESIS (CPT  .       PRE-OP DIAGNOSIS:   RIGHT  PLEURAL EFFUSION    POST-OP DIAGNOSIS:   RIGHT  PLEURAL EFFUSION    VOLUME REMOVED:  1350 cc    ANESTHESIA:    LOCAL ANESTHESIA WITH 1% LIDOCAINE 10 CC TOTAL.      CHEST ULTRASOUND FINDINGS:    A Turbo-M, Sonosite ultrasound with a 5-16 mHz probe was used to image the chest and localize the pleural effusion on the right chest.    A large anechoic space was seen consistent with an uncomplicated pleural effusion.    DESCRIPTION OF PROCEDURE:    After obtaining informed consent and localizing the safest location for thoracentesis, the  6th intercostal space was marked with a blunt, plastic needle cap in the mid scapular line.    An [place] AK-0100 Pleral-Seal thoracentesis kit was used to perform the procedure.    The skin was cleansed with the supplied chlorhexidine swab and then draped in the usual fashion.    Using the previously marked location as a guide, a 22 G 1.5 inch needle was used to inject 1% lidocaine into the skin and subcutaneous tissue, as well as onto the underlying rib and inter-costal muscles.  Pleural fluid was aspirated to assure proper location and additional lidocaine was injected into the pleural space prior to removing the anesthesia needle.    A 3mm incision was then made with the supplied scalpel in the usual fashion to facilitate the insertion of the thoracentesis needle.    The needle with an 8 Upper sorbian thoracentesis catheter was then introduced into the chest through the previously made incision in the usual fashion, the rib localized with the needle, and the catheter then marched over the rib into the pleural space.    After aspirating fluid, the thoracentesis catheter was then placed into the chest using the needle itself as a trocar.  The needle was then removed

## 2025-01-21 NOTE — DISCHARGE INSTRUCTIONS
Thoracentesis: What to Expect at Home  Your Recovery  Thoracentesis (say \"tapy-nm-rgp-PERI-sis\") is a procedure to remove fluid from the space between the lungs and the chest wall (pleural space). This procedure may also be called a \"chest tap.\" It's normal to have a small amount of fluid in the pleural space. But too much fluid can build up because of problems such as infection, heart failure, or lung cancer. The procedure may have been done to help with shortness of breath and pain caused by the fluid buildup. Or you may have had this procedure so the doctor could test the fluid to find the cause of the buildup.  Your chest may be sore where the doctor put the needle or catheter into your skin (the puncture site). This usually gets better after a day or two. You can go back to work or your normal activities as soon as you feel up to it.  If a large amount of pleural fluid was removed during the procedure, you will probably be able to breathe more easily.  If more pleural fluid collects and needs to be removed, another thoracentesis may be done later.  This care sheet gives you a general idea about how long it will take for you to recover. But each person recovers at a different pace. Follow the steps below to feel better as quickly as possible.  How can you care for yourself at home?  Activity    Rest when you feel tired. Getting enough sleep will help you recover.     Avoid strenuous activities, such as bicycle riding, jogging, weight lifting, or aerobic exercise, until your doctor says it is okay.     You may shower. Do not take a bath until the puncture site has healed, or until your doctor tells you it is okay.     Ask your doctor when you can drive again.     You may need to take 1 or 2 days off from work. It depends on the type of work you do and how you feel.   Diet    You can eat your normal diet.     Drink plenty of fluids (unless your doctor tells you not to).   Medicines    Your doctor will tell you

## 2025-01-22 ENCOUNTER — HOSPITAL ENCOUNTER (OUTPATIENT)
Age: 75
Setting detail: OBSERVATION
Discharge: HOME | End: 2025-01-23
Attending: INTERNAL MEDICINE | Admitting: INTERNAL MEDICINE
Payer: MEDICARE

## 2025-01-22 DIAGNOSIS — I20.9 ANGINA, CLASS III (HCC): ICD-10-CM

## 2025-01-22 DIAGNOSIS — Z98.61 STATUS POST PERCUTANEOUS TRANSLUMINAL CORONARY ANGIOPLASTY: Primary | ICD-10-CM

## 2025-01-22 PROBLEM — I25.10 CAD S/P PERCUTANEOUS CORONARY ANGIOPLASTY: Status: ACTIVE | Noted: 2025-01-22

## 2025-01-22 LAB
ECHO BSA: 1.65 M2
EKG ATRIAL RATE: 108 BPM
EKG DIAGNOSIS: NORMAL
EKG P AXIS: 70 DEGREES
EKG P-R INTERVAL: 186 MS
EKG Q-T INTERVAL: 446 MS
EKG QRS DURATION: 118 MS
EKG QTC CALCULATION (BAZETT): 495 MS
EKG R AXIS: 40 DEGREES
EKG T AXIS: 89 DEGREES
EKG VENTRICULAR RATE: 74 BPM

## 2025-01-22 PROCEDURE — 85347 COAGULATION TIME ACTIVATED: CPT

## 2025-01-22 PROCEDURE — 6370000000 HC RX 637 (ALT 250 FOR IP): Performed by: INTERNAL MEDICINE

## 2025-01-22 PROCEDURE — C1725 CATH, TRANSLUMIN NON-LASER: HCPCS | Performed by: INTERNAL MEDICINE

## 2025-01-22 PROCEDURE — 2709999900 HC NON-CHARGEABLE SUPPLY: Performed by: INTERNAL MEDICINE

## 2025-01-22 PROCEDURE — C9600 PERC DRUG-EL COR STENT SING: HCPCS | Performed by: INTERNAL MEDICINE

## 2025-01-22 PROCEDURE — C1887 CATHETER, GUIDING: HCPCS | Performed by: INTERNAL MEDICINE

## 2025-01-22 PROCEDURE — 93458 L HRT ARTERY/VENTRICLE ANGIO: CPT | Performed by: INTERNAL MEDICINE

## 2025-01-22 PROCEDURE — C1894 INTRO/SHEATH, NON-LASER: HCPCS | Performed by: INTERNAL MEDICINE

## 2025-01-22 PROCEDURE — 2500000003 HC RX 250 WO HCPCS: Performed by: INTERNAL MEDICINE

## 2025-01-22 PROCEDURE — G0378 HOSPITAL OBSERVATION PER HR: HCPCS

## 2025-01-22 PROCEDURE — C1769 GUIDE WIRE: HCPCS | Performed by: INTERNAL MEDICINE

## 2025-01-22 PROCEDURE — 99153 MOD SED SAME PHYS/QHP EA: CPT | Performed by: INTERNAL MEDICINE

## 2025-01-22 PROCEDURE — 92928 PRQ TCAT PLMT NTRAC ST 1 LES: CPT | Performed by: INTERNAL MEDICINE

## 2025-01-22 PROCEDURE — 94660 CPAP INITIATION&MGMT: CPT

## 2025-01-22 PROCEDURE — 99152 MOD SED SAME PHYS/QHP 5/>YRS: CPT | Performed by: INTERNAL MEDICINE

## 2025-01-22 PROCEDURE — 93005 ELECTROCARDIOGRAM TRACING: CPT | Performed by: INTERNAL MEDICINE

## 2025-01-22 PROCEDURE — 6360000002 HC RX W HCPCS: Performed by: INTERNAL MEDICINE

## 2025-01-22 PROCEDURE — C1874 STENT, COATED/COV W/DEL SYS: HCPCS | Performed by: INTERNAL MEDICINE

## 2025-01-22 PROCEDURE — 93010 ELECTROCARDIOGRAM REPORT: CPT | Performed by: INTERNAL MEDICINE

## 2025-01-22 PROCEDURE — 6360000004 HC RX CONTRAST MEDICATION: Performed by: INTERNAL MEDICINE

## 2025-01-22 DEVICE — STENT ONYXNG25026UX ONYX 2.50X26RX
Type: IMPLANTABLE DEVICE | Status: FUNCTIONAL
Brand: ONYX FRONTIER™

## 2025-01-22 RX ORDER — ALBUTEROL SULFATE 0.83 MG/ML
2.5 SOLUTION RESPIRATORY (INHALATION) EVERY 6 HOURS PRN
Status: DISCONTINUED | OUTPATIENT
Start: 2025-01-22 | End: 2025-01-23 | Stop reason: HOSPADM

## 2025-01-22 RX ORDER — HEPARIN SODIUM 10000 [USP'U]/ML
INJECTION, SOLUTION INTRAVENOUS; SUBCUTANEOUS PRN
Status: DISCONTINUED | OUTPATIENT
Start: 2025-01-22 | End: 2025-01-22 | Stop reason: HOSPADM

## 2025-01-22 RX ORDER — FAMOTIDINE 20 MG/1
20 TABLET, FILM COATED ORAL
Status: DISCONTINUED | OUTPATIENT
Start: 2025-01-22 | End: 2025-01-23 | Stop reason: HOSPADM

## 2025-01-22 RX ORDER — LEVOTHYROXINE SODIUM 112 UG/1
112 TABLET ORAL DAILY
Status: DISCONTINUED | OUTPATIENT
Start: 2025-01-22 | End: 2025-01-23 | Stop reason: HOSPADM

## 2025-01-22 RX ORDER — NITROGLYCERIN 20 MG/100ML
INJECTION INTRAVENOUS PRN
Status: DISCONTINUED | OUTPATIENT
Start: 2025-01-22 | End: 2025-01-22 | Stop reason: HOSPADM

## 2025-01-22 RX ORDER — MAGNESIUM HYDROXIDE/ALUMINUM HYDROXICE/SIMETHICONE 120; 1200; 1200 MG/30ML; MG/30ML; MG/30ML
SUSPENSION ORAL PRN
Status: DISCONTINUED | OUTPATIENT
Start: 2025-01-22 | End: 2025-01-22 | Stop reason: HOSPADM

## 2025-01-22 RX ORDER — FLUTICASONE PROPIONATE 50 MCG
2 SPRAY, SUSPENSION (ML) NASAL DAILY
Status: DISCONTINUED | OUTPATIENT
Start: 2025-01-22 | End: 2025-01-23 | Stop reason: HOSPADM

## 2025-01-22 RX ORDER — HYDROCODONE BITARTRATE AND ACETAMINOPHEN 5; 325 MG/1; MG/1
1 TABLET ORAL EVERY 4 HOURS PRN
Status: DISCONTINUED | OUTPATIENT
Start: 2025-01-22 | End: 2025-01-23 | Stop reason: HOSPADM

## 2025-01-22 RX ORDER — SPIRONOLACTONE 25 MG/1
25 TABLET ORAL DAILY
Status: DISCONTINUED | OUTPATIENT
Start: 2025-01-22 | End: 2025-01-23 | Stop reason: HOSPADM

## 2025-01-22 RX ORDER — ASPIRIN 81 MG/1
324 TABLET, CHEWABLE ORAL ONCE
Status: DISCONTINUED | OUTPATIENT
Start: 2025-01-22 | End: 2025-01-22

## 2025-01-22 RX ORDER — HYDROCODONE BITARTRATE AND HOMATROPINE METHYLBROMIDE ORAL SOLUTION 5; 1.5 MG/5ML; MG/5ML
2.5 LIQUID ORAL EVERY 6 HOURS PRN
Status: DISCONTINUED | OUTPATIENT
Start: 2025-01-22 | End: 2025-01-23 | Stop reason: HOSPADM

## 2025-01-22 RX ORDER — ACETAMINOPHEN 325 MG/1
650 TABLET ORAL EVERY 4 HOURS PRN
Status: DISCONTINUED | OUTPATIENT
Start: 2025-01-22 | End: 2025-01-23 | Stop reason: HOSPADM

## 2025-01-22 RX ORDER — SODIUM CHLORIDE 0.9 % (FLUSH) 0.9 %
5-40 SYRINGE (ML) INJECTION EVERY 12 HOURS SCHEDULED
Status: DISCONTINUED | OUTPATIENT
Start: 2025-01-22 | End: 2025-01-23 | Stop reason: HOSPADM

## 2025-01-22 RX ORDER — CLOPIDOGREL BISULFATE 75 MG/1
75 TABLET ORAL DAILY
Status: DISCONTINUED | OUTPATIENT
Start: 2025-01-23 | End: 2025-01-23 | Stop reason: HOSPADM

## 2025-01-22 RX ORDER — CLOPIDOGREL BISULFATE 75 MG/1
TABLET ORAL PRN
Status: DISCONTINUED | OUTPATIENT
Start: 2025-01-22 | End: 2025-01-22 | Stop reason: HOSPADM

## 2025-01-22 RX ORDER — SODIUM CHLORIDE 9 MG/ML
INJECTION, SOLUTION INTRAVENOUS CONTINUOUS
Status: DISCONTINUED | OUTPATIENT
Start: 2025-01-22 | End: 2025-01-22

## 2025-01-22 RX ORDER — HEPARIN SODIUM 200 [USP'U]/100ML
INJECTION, SOLUTION INTRAVENOUS CONTINUOUS PRN
Status: DISCONTINUED | OUTPATIENT
Start: 2025-01-22 | End: 2025-01-22 | Stop reason: HOSPADM

## 2025-01-22 RX ORDER — ASPIRIN 81 MG/1
81 TABLET ORAL DAILY
Status: DISCONTINUED | OUTPATIENT
Start: 2025-01-22 | End: 2025-01-23 | Stop reason: HOSPADM

## 2025-01-22 RX ORDER — MIDAZOLAM HYDROCHLORIDE 1 MG/ML
INJECTION, SOLUTION INTRAMUSCULAR; INTRAVENOUS PRN
Status: DISCONTINUED | OUTPATIENT
Start: 2025-01-22 | End: 2025-01-22 | Stop reason: HOSPADM

## 2025-01-22 RX ORDER — SODIUM CHLORIDE 9 MG/ML
INJECTION, SOLUTION INTRAVENOUS PRN
Status: DISCONTINUED | OUTPATIENT
Start: 2025-01-22 | End: 2025-01-23 | Stop reason: HOSPADM

## 2025-01-22 RX ORDER — LIDOCAINE HYDROCHLORIDE 10 MG/ML
INJECTION, SOLUTION INFILTRATION; PERINEURAL PRN
Status: DISCONTINUED | OUTPATIENT
Start: 2025-01-22 | End: 2025-01-22 | Stop reason: HOSPADM

## 2025-01-22 RX ORDER — IOPAMIDOL 755 MG/ML
INJECTION, SOLUTION INTRAVASCULAR PRN
Status: DISCONTINUED | OUTPATIENT
Start: 2025-01-22 | End: 2025-01-22 | Stop reason: HOSPADM

## 2025-01-22 RX ORDER — HYDROCODONE BITARTRATE AND ACETAMINOPHEN 5; 325 MG/1; MG/1
2 TABLET ORAL EVERY 4 HOURS PRN
Status: DISCONTINUED | OUTPATIENT
Start: 2025-01-22 | End: 2025-01-23 | Stop reason: HOSPADM

## 2025-01-22 RX ORDER — SODIUM CHLORIDE 0.9 % (FLUSH) 0.9 %
5-40 SYRINGE (ML) INJECTION PRN
Status: DISCONTINUED | OUTPATIENT
Start: 2025-01-22 | End: 2025-01-23 | Stop reason: HOSPADM

## 2025-01-22 RX ORDER — METOPROLOL SUCCINATE 25 MG/1
25 TABLET, EXTENDED RELEASE ORAL DAILY
Status: DISCONTINUED | OUTPATIENT
Start: 2025-01-22 | End: 2025-01-23 | Stop reason: HOSPADM

## 2025-01-22 RX ADMIN — FAMOTIDINE 20 MG: 20 TABLET, FILM COATED ORAL at 20:23

## 2025-01-22 RX ADMIN — ACETAMINOPHEN 650 MG: 325 TABLET ORAL at 20:23

## 2025-01-22 RX ADMIN — LEVOTHYROXINE SODIUM 112 MCG: 0.11 TABLET ORAL at 17:18

## 2025-01-22 RX ADMIN — SPIRONOLACTONE 25 MG: 25 TABLET ORAL at 17:18

## 2025-01-22 RX ADMIN — SODIUM CHLORIDE, PRESERVATIVE FREE 10 ML: 5 INJECTION INTRAVENOUS at 20:23

## 2025-01-22 RX ADMIN — SACUBITRIL AND VALSARTAN 1 TABLET: 24; 26 TABLET, FILM COATED ORAL at 17:18

## 2025-01-22 RX ADMIN — METOPROLOL SUCCINATE 25 MG: 25 TABLET, EXTENDED RELEASE ORAL at 17:18

## 2025-01-22 ASSESSMENT — PAIN SCALES - GENERAL: PAINLEVEL_OUTOF10: 0

## 2025-01-22 NOTE — PROGRESS NOTES
TRANSFER - OUT REPORT:    Verbal report given to ROSARIO Hensley on Poonam Luciano being transferred to [facility] for routine progression of patient care       Report consisted of patient’s Situation, Background, Assessment and Recommendations (SBAR).     Information from the following report(s) SBAR, Kardex, Procedure Summary, and Cardiac Rhythm NSR  was reviewed with the receiving nurse.    Opportunity for questions and clarification was provided.

## 2025-01-22 NOTE — PROGRESS NOTES
Patient received to [facility] room # 12.  Ambulatory from [facility]. Patient scheduled for [facility] today with Dr Orellana. Procedure reviewed & questions answered, voiced good understanding consent obtained & placed on chart. All medications and medical history reviewed. Will prep patient per orders. Patient & family updated on plan of care.      The patient has a fraility score of 3-MANAGING WELL, based on pt ability to ambulate independently and to complete own ADLs.

## 2025-01-22 NOTE — PROGRESS NOTES
TRANSFER - IN REPORT:    Verbal report received from ROSARIO Goetz on Poonam Luciano being received from cath lab for routine progression of patient care      Report consisted of patient’s Situation, Background, Assessment and Recommendations(SBAR).     Information from the following report(s) SBAR, Procedure Summary, Intake/Output, and MAR was reviewed with the receiving nurse.    Opportunity for questions and clarification was provided.      Assessment completed upon patient’s arrival to unit and care assumed.

## 2025-01-22 NOTE — PROGRESS NOTES
TRANSFER - IN REPORT:    Verbal report received from ROSARIO Hoang on Poonam Luciano being received from [facility] for routine progression of patient care      Report consisted of patient’s Situation, Background, Assessment and Recommendations(SBAR).     Information from the following report(s) SBAR, Kardex, Procedure Summary, MAR, and Recent Results was reviewed with the receiving nurse.    Opportunity for questions and clarification was provided.      Assessment completed upon patient’s arrival to unit and care assumed.

## 2025-01-22 NOTE — PROGRESS NOTES
Radial compression band removed at 1330 after slowly reducing air from 12 cc to zero as per hospital protocol. No bleeding or hematoma noted. 2 x 2 gauze with tegaderm placed over puncture site. The affected extremity is warm and dry to the touch. Frequent vital signs documented per flowheet. Patient instructed to call if any bleeding noted on gauze. Patient verbalized understanding the nursing instructions.

## 2025-01-22 NOTE — PROGRESS NOTES
TRANSFER - OUT REPORT:    [facility] with Dr. Orellana  Access: right radial   1 stent to LAD    Radial band with 12 ml in band   No bleeding or hematoma site soft    MAR  4 mg versed  50 mcg Fentanyl   10,000 units heparin  600 mg plavix   30 ml mylanta       Verbal report given to RN on Poonam Luciano  being transferred to CPRU for routine progression of patient care       Report consisted of patient's Situation, Background, Assessment and Recommendations(SBAR).     Information from the following report(s) Nurse Handoff Report and MAR was reviewed with the receiving nurse.    Opportunity for questions and clarification was provided.      Patient transported with:  Registered Nurse

## 2025-01-23 VITALS
SYSTOLIC BLOOD PRESSURE: 125 MMHG | TEMPERATURE: 97.5 F | HEART RATE: 53 BPM | DIASTOLIC BLOOD PRESSURE: 52 MMHG | HEIGHT: 59 IN | BODY MASS INDEX: 29.23 KG/M2 | WEIGHT: 145 LBS | RESPIRATION RATE: 15 BRPM | OXYGEN SATURATION: 97 %

## 2025-01-23 LAB
ANION GAP SERPL CALC-SCNC: 7 MMOL/L (ref 7–16)
BACTERIA SPEC CULT: NORMAL
BUN SERPL-MCNC: 8 MG/DL (ref 8–23)
CALCIUM SERPL-MCNC: 8.2 MG/DL (ref 8.8–10.2)
CHLORIDE SERPL-SCNC: 108 MMOL/L (ref 98–107)
CO2 SERPL-SCNC: 23 MMOL/L (ref 20–29)
CREAT SERPL-MCNC: 0.74 MG/DL (ref 0.6–1.1)
EKG ATRIAL RATE: 74 BPM
EKG DIAGNOSIS: NORMAL
EKG P AXIS: 102 DEGREES
EKG Q-T INTERVAL: 424 MS
EKG QRS DURATION: 120 MS
EKG QTC CALCULATION (BAZETT): 470 MS
EKG R AXIS: 62 DEGREES
EKG T AXIS: 98 DEGREES
EKG VENTRICULAR RATE: 74 BPM
ERYTHROCYTE [DISTWIDTH] IN BLOOD BY AUTOMATED COUNT: 16.7 % (ref 11.9–14.6)
GLUCOSE SERPL-MCNC: 149 MG/DL (ref 70–99)
GRAM STN SPEC: NORMAL
GRAM STN SPEC: NORMAL
HCT VFR BLD AUTO: 28.8 % (ref 35.8–46.3)
HGB BLD-MCNC: 8.8 G/DL (ref 11.7–15.4)
MCH RBC QN AUTO: 26.4 PG (ref 26.1–32.9)
MCHC RBC AUTO-ENTMCNC: 30.6 G/DL (ref 31.4–35)
MCV RBC AUTO: 86.5 FL (ref 82–102)
NRBC # BLD: 0 K/UL (ref 0–0.2)
PLATELET # BLD AUTO: 122 K/UL (ref 150–450)
PMV BLD AUTO: 9.6 FL (ref 9.4–12.3)
POTASSIUM SERPL-SCNC: 3.7 MMOL/L (ref 3.5–5.1)
RBC # BLD AUTO: 3.33 M/UL (ref 4.05–5.2)
SERVICE CMNT-IMP: NORMAL
SODIUM SERPL-SCNC: 138 MMOL/L (ref 136–145)
WBC # BLD AUTO: 3.5 K/UL (ref 4.3–11.1)

## 2025-01-23 PROCEDURE — 93010 ELECTROCARDIOGRAM REPORT: CPT | Performed by: INTERNAL MEDICINE

## 2025-01-23 PROCEDURE — 36415 COLL VENOUS BLD VENIPUNCTURE: CPT

## 2025-01-23 PROCEDURE — G0378 HOSPITAL OBSERVATION PER HR: HCPCS

## 2025-01-23 PROCEDURE — 2500000003 HC RX 250 WO HCPCS: Performed by: INTERNAL MEDICINE

## 2025-01-23 PROCEDURE — 85027 COMPLETE CBC AUTOMATED: CPT

## 2025-01-23 PROCEDURE — 99238 HOSP IP/OBS DSCHRG MGMT 30/<: CPT | Performed by: INTERNAL MEDICINE

## 2025-01-23 PROCEDURE — 6370000000 HC RX 637 (ALT 250 FOR IP): Performed by: INTERNAL MEDICINE

## 2025-01-23 PROCEDURE — 80048 BASIC METABOLIC PNL TOTAL CA: CPT

## 2025-01-23 RX ORDER — NITROGLYCERIN 0.4 MG/1
0.4 TABLET SUBLINGUAL EVERY 5 MIN PRN
Qty: 25 TABLET | Refills: 3 | Status: SHIPPED | OUTPATIENT
Start: 2025-01-23

## 2025-01-23 RX ORDER — METFORMIN HYDROCHLORIDE 500 MG/1
500 TABLET, EXTENDED RELEASE ORAL
Qty: 30 TABLET | Refills: 3 | Status: SHIPPED | OUTPATIENT
Start: 2025-01-24 | End: 2033-09-09

## 2025-01-23 RX ORDER — CLOPIDOGREL BISULFATE 75 MG/1
75 TABLET ORAL DAILY
Qty: 30 TABLET | Refills: 11 | Status: SHIPPED | OUTPATIENT
Start: 2025-01-24

## 2025-01-23 RX ADMIN — FLUTICASONE PROPIONATE 2 SPRAY: 50 SPRAY, METERED NASAL at 07:42

## 2025-01-23 RX ADMIN — ASPIRIN 81 MG: 81 TABLET, COATED ORAL at 07:42

## 2025-01-23 RX ADMIN — CLOPIDOGREL BISULFATE 75 MG: 75 TABLET ORAL at 07:42

## 2025-01-23 RX ADMIN — SODIUM CHLORIDE, PRESERVATIVE FREE 10 ML: 5 INJECTION INTRAVENOUS at 07:44

## 2025-01-23 RX ADMIN — SPIRONOLACTONE 25 MG: 25 TABLET ORAL at 07:42

## 2025-01-23 RX ADMIN — SACUBITRIL AND VALSARTAN 1 TABLET: 24; 26 TABLET, FILM COATED ORAL at 07:42

## 2025-01-23 RX ADMIN — LEVOTHYROXINE SODIUM 112 MCG: 0.11 TABLET ORAL at 06:14

## 2025-01-23 NOTE — DISCHARGE SUMMARY
[facility] Discharge Summary     Patient ID:  Poonam Luciano  [id number]  74 y.o.  1950    Admit date: 1/22/2025    Discharge date:  01/23/2025    Admitting Physician: Demetrius Orellana MD     Discharge Physician: Dr. Anna    Admission Diagnoses: Angina, class III ([facility]) [I20.9]  CAD S/P percutaneous coronary angioplasty [I25.10, Z98.61]  Status post percutaneous transluminal coronary angioplasty [Z98.61]    Discharge Diagnoses:   Patient Active Problem List    Diagnosis Date Noted    CAD S/P percutaneous coronary angioplasty 01/22/2025    Status post percutaneous transluminal coronary angioplasty 01/22/2025    Angina, class III ([facility]) 01/15/2025    HFrEF (heart failure with reduced ejection fraction) ([facility]) 01/06/2025    Hemorrhoids 01/04/2025    Lower GI bleeding 01/03/2025    Pleural effusion 12/05/2024    Abnormal CT of the chest 08/21/2024    Chronic cough 02/28/2024    GERD (gastroesophageal reflux disease) 02/28/2024    Diverticulosis of large intestine without perforation or abscess without bleeding 05/30/2023    TRACY (nonalcoholic steatohepatitis) 12/02/2020    Mixed hyperlipidemia 05/26/2017    Acquired hypothyroidism 06/07/2016    Primary hypertension 06/07/2016    Type 2 diabetes mellitus without complication ([facility]) 06/07/2016       Cardiology Procedures this admission:  Left heart catheterization with PCI  Consults: none    Hospital Course: Patient was seen at the office of [facility] by Dr. Rosa for hospital follow up on 1/15/25 after being admitted for shortness of breath.Echo showed EF 35-40% with regional wall motion abnormalities and was subsequently scheduled for an AM Admission C at [facility] on 1/22/25.     Patient underwent cardiac catheterization by Dr. Orellana.   LHC results:     01/22/25    CARDIAC PROCEDURE 01/22/2025 10:08 AM (Final)    Conclusion    High-grade mid LAD stenosis treated with a 2.5 x 26 mm resolute Jorge Alberto drug-eluting stent.  This  was postdilated 2.75 mm diameter.  Mild disease in circumflex and RCA.    Normal LV systolic function    Right radial approach.  Tiger 4.0 used for diagnostic imaging      PCI note    Lesion: Mid LAD stenosis 90%.  Postintervention stenosis 0%    Technical factors: Patient given intravenous heparin.  EBU 3.0 guide.  Prowater wire is placed in the distal LAD and a whisper wire is placed in a very small second diagonal branch to help maintain patency with stenting.  The lesion was predilated with a 2.25 mm balloon.  Following predilatation a 2.5 x 26 mm resolute Jorge Alberto drug-eluting stent was positioned in the mid to distal LAD and deployed to 16 geovani.  Following stent deployment a 2.75 noncompliant balloon was positioned and deployed to 20 geovani on multiple occasions for appropriate postdilatation.  Following postdilatation with excellent angiographic result with no residual stenosis.  The wire in the diagonal and LAD removed and final angiography showed ANGELICA-3 flow in the small second diagonal    Signed by: Demetrius Orellana MD on 1/22/2025 10:08 AM      Patient tolerated the procedure well and was taken to the telemetry floor for recovery. The morning of discharge, patient was up feeling well without any complaints of chest pain or shortness of breath. Patient's right radial cath site was clean, dry and intact without hematoma or bruit. Patient's labs were WNL. Patient was seen and examined by Dr. Anna and determined stable and ready for discharge. Patient was instructed on the importance of medication compliance including taking aspirin and Pavix everyday without missing a dose.  After receiving drug eluting stents, the patient will remain on dual anti-platelet therapy for 1 year.  For maximized medical therapy for CAD, patient will continue BB, ARB. She has allergy to statins with myalgia.  The patient will follow up with [facility] -- Dr. Rosa. Patient has been referred to cardiac

## 2025-01-23 NOTE — PLAN OF CARE
Problem: Safety - Adult  Goal: Free from fall injury  1/23/2025 0845 by Beth Johnson RN  Outcome: Adequate for Discharge  1/22/2025 2159 by Blanca Clark RN  Outcome: Progressing  Flowsheets (Taken 1/22/2025 1650 by Yara Lanier RN)  Free From Fall Injury: Instruct family/caregiver on patient safety     Problem: Chronic Conditions and Co-morbidities  Goal: Patient's chronic conditions and co-morbidity symptoms are monitored and maintained or improved  1/23/2025 0845 by Beth Johnson RN  Outcome: Adequate for Discharge  1/22/2025 2159 by Blanca Clark RN  Outcome: Progressing  Flowsheets (Taken 1/22/2025 1650 by Yara Lanier RN)  Care Plan - Patient's Chronic Conditions and Co-Morbidity Symptoms are Monitored and Maintained or Improved:   Update acute care plan with appropriate goals if chronic or comorbid symptoms are exacerbated and prevent overall improvement and discharge   Collaborate with multidisciplinary team to address chronic and comorbid conditions and prevent exacerbation or deterioration   Monitor and assess patient's chronic conditions and comorbid symptoms for stability, deterioration, or improvement     Problem: Discharge Planning  Goal: Discharge to home or other facility with appropriate resources  1/23/2025 0845 by Beth Johnson RN  Outcome: Adequate for Discharge  1/22/2025 2159 by Blanca Clark RN  Outcome: Progressing  Flowsheets (Taken 1/22/2025 1650 by Yara Lanier RN)  Discharge to home or other facility with appropriate resources:   Identify barriers to discharge with patient and caregiver   Identify discharge learning needs (meds, wound care, etc)   Arrange for needed discharge resources and transportation as appropriate   Refer to discharge planning if patient needs post-hospital services based on physician order or complex needs related to functional status, cognitive ability or social support system     Problem: ABCDS Injury Assessment  Goal: Absence of

## 2025-01-23 NOTE — PROGRESS NOTES
Cardiac Rehab: Spoke with patient regarding referral to cardiac rehab. Patient meets admission criteria based on PCI (1/22/25).  Written information about Cardiac Rehab given and reviewed with patient. Discussed lifestyle modifications to promote cardiac wellness. Patient indicated that does not want to participate in the cardiac rehab program but will consider. She was encouraged to tour our program when in for follow up appt. Her Cardiologist is Dr. Rosa.      Thank you,  Ambreen Sidhu, RN, BSN  Cardiopulmonary Rehabilitation Nurse Liaison  [facility]

## 2025-01-23 NOTE — PLAN OF CARE
Problem: Safety - Adult  Goal: Free from fall injury  Outcome: Progressing  Flowsheets (Taken 1/22/2025 1650 by Yraa Lanier, RN)  Free From Fall Injury: Instruct family/caregiver on patient safety     Problem: Chronic Conditions and Co-morbidities  Goal: Patient's chronic conditions and co-morbidity symptoms are monitored and maintained or improved  Outcome: Progressing  Flowsheets (Taken 1/22/2025 1650 by Yara Lanier, RN)  Care Plan - Patient's Chronic Conditions and Co-Morbidity Symptoms are Monitored and Maintained or Improved:   Update acute care plan with appropriate goals if chronic or comorbid symptoms are exacerbated and prevent overall improvement and discharge   Collaborate with multidisciplinary team to address chronic and comorbid conditions and prevent exacerbation or deterioration   Monitor and assess patient's chronic conditions and comorbid symptoms for stability, deterioration, or improvement     Problem: Discharge Planning  Goal: Discharge to home or other facility with appropriate resources  Outcome: Progressing  Flowsheets (Taken 1/22/2025 1650 by Yara Lanier, RN)  Discharge to home or other facility with appropriate resources:   Identify barriers to discharge with patient and caregiver   Identify discharge learning needs (meds, wound care, etc)   Arrange for needed discharge resources and transportation as appropriate   Refer to discharge planning if patient needs post-hospital services based on physician order or complex needs related to functional status, cognitive ability or social support system

## 2025-01-23 NOTE — DISCHARGE INSTRUCTIONS
POST PCI/STENT DISCHARGE INSTRUCTIONS    1. Check puncture site frequently for swelling or bleeding. If there is any bleeding, lie down and apply pressure over the area with a clean towel or washcloth and call 911. Notify your doctor for any redness, swelling, drainage, or oozing from the puncture site. Notify your doctor for any fever or chills.      2. If the extremity becomes cold, numb, or painful call [facility] at [phone].    3. Activity should be limited for the next 48-72 hours.  No heavy lifting (anything over 10 pounds) for 3 days.  No pushing or pulling with *** for the next three days.    4.  You may resume your usual diet. Drink more fluids than usual.    5.  Have a responsible person drive you home and stay with you for at least 24 hours after your heart catheterization/angiography.  No driving for 24 hours.    6. You may remove bandage from your *** in 24 hours. You may shower in 24 hours. No tub baths, hot tubs, or swimming for 1 week. Do not place any lotions, creams, powders, or ointments over puncture site for 1 week. You may place a clean band-aid over the puncture site each day for 5 days. Change daily.      YOU ARE BEING DISCHARGED ON TWO ANTI-PLATELET MEDICATIONS    1- ***    2- ***    THESE MEDICATIONS  ARE VERY IMPORTANT TO YOUR RECOVERY AND  MUST BE TAKEN EXACTLY AS PRESCRIBED & NOT STOPPED FOR ANY REASON. THESE MAY ONLY BE STOPPED WITH AN ORDER FROM YOUR CARDIOLOGIST. PLEASE HAVE THESE FILLED IMMEDIATELY AND START TAKING ***     ***IF YOUR PHARMACY IS UNABLE TO FILL YOUR ANTIPLATELET MEDICATION *** IMMEDIATELY,  PLEASE CALL [facility] -[phone], EVEN IF IT'S AFTER OFFICE HOURS. AGAIN, IT'S VERY IMPORTANT THAT NO DOSES ARE MISSED    YOU ARE ALSO BEING DISCHARGED ON A MEDICATION FOR CHOLESTEROL MANAGEMENT.    1- ***      You have also been referred to [facility]. Someone from Cardiac Rehab will be calling you to set up a follow up appointment.   If  they have not called you within a week,  please call [phone].      Sedation for a Medical Procedure: Care Instructions     You were given a sedative medication during your visit. While many of the effects will have worn   off before you leave; you may continue to feel some effects for several hours.      Common side effects from sedation include:  Feeling sleepy. (Your doctors and nurses will make sure you are not too sleepy to go home.)  Nausea and vomiting. This usually does not last long.  Feeling tired.     How can you care for yourself at home?  Activity    Don't do anything for 24 hours that requires attention to detail. It takes time for the medicine effects to completely wear off.     Do not make important legal decisions for 24 hours.     Do not sign any legal documents for 24 hours.     Do not drink alcohol today     For your safety, you should not drive or operate heavy machinery for the remainder of the day     Rest when you feel tired. Getting enough sleep will help you recover.      Diet    You can eat your normal diet, unless your doctor gives you other instructions. If your stomach is upset, try clear liquids and bland, low-fat foods like plain toast or rice.     Drink plenty of fluids (unless your doctor tells you not to).     Don't drink alcohol for 24 hours.      Medicines    Be safe with medicines. Read and follow all instructions on the label.  If the doctor gave you a prescription medicine for pain, take it as prescribed.  If you are not taking a prescription pain medicine, ask your doctor if you can take an over-the-counter medicine.     If you think your pain medicine is making you sick to your stomach:  Take your medicine after meals (unless your doctor has told you not to).  Ask your doctor for a different pain medicine.             Percutaneous Coronary Intervention: What to Expect at Home  Your Recovery     Percutaneous coronary intervention (PCI) is the name for procedures that

## 2025-01-23 NOTE — PROGRESS NOTES
[facility] CARDIOLOGY PROGRESS NOTE    1/23/2025 7:06 AM    Admit Date: 1/22/2025        Subjective:   Stable overnight without angina, CHF, or palpitations. Vitals stable and controlled. No other complaints overnight. Tolerating meds well.         Objective:      Vitals:    01/22/25 2246 01/23/25 0021 01/23/25 0436 01/23/25 0702   BP:  121/61 (!) 134/56 (!) 125/52   Pulse: 54 60 59 55   Resp: 18 18 18 15   Temp:  97.9 °F (36.6 °C) 97.9 °F (36.6 °C) 97.5 °F (36.4 °C)   TempSrc:    Oral   SpO2: 99% 98% 99% 97%   Weight:       Height:           Physical Exam:  Neck- supple, no JVD  CV- regular rate and rhythm no MRG  Lung- clear bilaterally  Abd- soft, nontender, nondistended  Ext- no edema, right radial CDI  Skin- warm and dry    Data Review:   Pertinent lab and noninvasive imaging over the past 24 hours reviewed and evaluated.    Recent Labs     01/21/25  0931 01/23/25  0309    138   K 3.8 3.7   BUN 11 8   WBC 3.1* 3.5*   HGB 9.9* 8.8*   HCT 32.7* 28.8*   * 122*     No results found for: \"LDL\", \"LDLDIRECT\"    Assessment and Plan:     Active Hospital Problems    CAD S/P percutaneous coronary angioplasty- Doing well since last visit without interval angina, CHF, palpitations, edema, presyncope or syncope.  Vitals controlled and tolerating meds well.       Status post percutaneous transluminal coronary angioplasty- The importance of compliance with dual antiplatelet therapy was emphasized.  The risk of non-compliance, including stent thrombosis, acute MI, acute LV systolic dysfunction, and death was discussed and understood.         *Angina, class III (HCC)- resolved, s/p LAD PCI.     Home today on dual antiplatelet therapy, give prescription for Plavix.  Continue GDMT for heart failure as currently taking.  Consider adding Jardiance in the outpatient setting.  Follow-up with primary cardiologist in the next couple of weeks.       JUANI STRATTON MD

## 2025-01-23 NOTE — CARE COORDINATION
Discharge order is in. Pt was seen at the office of [facility] by Dr. Rosa for hospital follow up on 1/15/25 after being admitted for SOB. Pt was subsequently scheduled for a LHC at [facility] on 1/22/25 and underwent cardiac catheterization by Dr. Orellana. Pt tolerated the procedure well and is now discharging home today in stable condition. PTA, pt lives alone in a one story private residence. Fairly indep at baseline. Her two sons live close by, supportive and help as needed. A/O x4. Drives. Retired. On RA. Denies DME needs or recent h/o falls. Denies current HH services. PCP established. [place] Medicare verified and able to afford home meds. No discharge needs identified by CM. Tx goals met.     01/23/25 0856   Service Assessment   Patient Orientation Alert and Oriented;Person;Place;Situation;Self   Cognition Alert   History Provided By Patient   Primary Caregiver Self   Support Systems Children;Family Members;[place] Community;Friends/Neighbors   Patient's Healthcare Decision Maker is: Legal Next of Kin   PCP Verified by CM No   Last Visit to PCP Within last 6 months   Prior Functional Level Independent in ADLs/IADLs   Current Functional Level Independent in ADLs/IADLs   Can patient return to prior living arrangement Yes   Ability to make needs known: Good   Family able to assist with home care needs: Yes   Would you like for me to discuss the discharge plan with any other family members/significant others, and if so, who? No   Financial Resources Medicare   Community Resources None   Social/Functional History   Lives With Alone   Type of Home House   Home Layout One level   Bathroom Toilet Standard   Bathroom Accessibility Accessible   Home Equipment None   Receives Help From Family   Prior Level of Assist for ADLs Independent   Prior Level of Assist for Homemaking Independent   Ambulation Assistance Independent   Prior Level of Assist for Transfers Independent   Active  Yes   Occupation Retired   Discharge  Planning   Type of Residence House   Living Arrangements Alone   Current Services Prior To Admission None   Potential Assistance Needed N/A   DME Ordered? No   Potential Assistance Purchasing Medications No   Type of Home Care Services None   Patient expects to be discharged to: House   Services At/After Discharge   Transition of Care Consult (CM Consult) Discharge Planning   Services At/After Discharge None    Resource Information Provided? No   Mode of Transport at Discharge Other (see comment)  (Family)   Confirm Follow Up Transport Family   Condition of Participation: Discharge Planning   The Patient and/or Patient Representative was provided with a Choice of Provider? Patient   The Patient and/Or Patient Representative agree with the Discharge Plan? Yes   Freedom of Choice list was provided with basic dialogue that supports the patient's individualized plan of care/goals, treatment preferences, and shares the quality data associated with the providers?  Yes

## 2025-01-24 LAB
ACT BLD: 268 SECS (ref 70–128)
ADENOSINE DEAMINASE PLR-CCNC: 8 U/L (ref 0–30)
SPECIMEN SOURCE: NORMAL

## 2025-01-30 ENCOUNTER — OFFICE VISIT (OUTPATIENT)
Dept: RHEUMATOLOGY | Age: 75
End: 2025-01-30
Payer: MEDICARE

## 2025-01-30 VITALS
SYSTOLIC BLOOD PRESSURE: 120 MMHG | HEART RATE: 95 BPM | WEIGHT: 142 LBS | HEIGHT: 59 IN | DIASTOLIC BLOOD PRESSURE: 60 MMHG | OXYGEN SATURATION: 99 % | BODY MASS INDEX: 28.63 KG/M2

## 2025-01-30 DIAGNOSIS — R76.8 POSITIVE ANA (ANTINUCLEAR ANTIBODY): ICD-10-CM

## 2025-01-30 DIAGNOSIS — J84.9 ILD (INTERSTITIAL LUNG DISEASE) (HCC): Primary | ICD-10-CM

## 2025-01-30 PROCEDURE — G2211 COMPLEX E/M VISIT ADD ON: HCPCS | Performed by: INTERNAL MEDICINE

## 2025-01-30 PROCEDURE — 3074F SYST BP LT 130 MM HG: CPT | Performed by: INTERNAL MEDICINE

## 2025-01-30 PROCEDURE — 99205 OFFICE O/P NEW HI 60 MIN: CPT | Performed by: INTERNAL MEDICINE

## 2025-01-30 PROCEDURE — 3078F DIAST BP <80 MM HG: CPT | Performed by: INTERNAL MEDICINE

## 2025-01-30 PROCEDURE — 3017F COLORECTAL CA SCREEN DOC REV: CPT | Performed by: INTERNAL MEDICINE

## 2025-01-30 PROCEDURE — 1111F DSCHRG MED/CURRENT MED MERGE: CPT | Performed by: INTERNAL MEDICINE

## 2025-01-30 PROCEDURE — 1090F PRES/ABSN URINE INCON ASSESS: CPT | Performed by: INTERNAL MEDICINE

## 2025-01-30 PROCEDURE — 1159F MED LIST DOCD IN RCRD: CPT | Performed by: INTERNAL MEDICINE

## 2025-01-30 PROCEDURE — G8417 CALC BMI ABV UP PARAM F/U: HCPCS | Performed by: INTERNAL MEDICINE

## 2025-01-30 PROCEDURE — 1123F ACP DISCUSS/DSCN MKR DOCD: CPT | Performed by: INTERNAL MEDICINE

## 2025-01-30 PROCEDURE — 1036F TOBACCO NON-USER: CPT | Performed by: INTERNAL MEDICINE

## 2025-01-30 PROCEDURE — G8399 PT W/DXA RESULTS DOCUMENT: HCPCS | Performed by: INTERNAL MEDICINE

## 2025-01-30 PROCEDURE — G8427 DOCREV CUR MEDS BY ELIG CLIN: HCPCS | Performed by: INTERNAL MEDICINE

## 2025-01-30 ASSESSMENT — ROUTINE ASSESSMENT OF PATIENT INDEX DATA (RAPID3)
ON A SCALE OF ONE TO TEN, HOW DIFFICULT WAS IT FOR YOU TO COMPLETE THE LISTED DAILY PHYSICAL TASKS OVER THE LAST WEEK: 0.2
ON A SCALE OF ONE TO TEN, HOW MUCH PAIN HAVE YOU HAD BECAUSE OF YOUR CONDITION OVER THE PAST WEEK?: 0
ON A SCALE OF ONE TO TEN, CONSIDERING ALL THE WAYS IN WHICH ILLNESS AND HEALTH CONDITIONS MAY AFFECT YOU AT THIS TIME, PLEASE INDICATE BELOW HOW YOU ARE DOING:: 1
ON A SCALE OF ONE TO TEN, HOW MUCH OF A PROBLEM HAS UNUSUAL FATIGUE OR TIREDNESS BEEN FOR YOU OVER THE PAST WEEK?: 1

## 2025-01-30 NOTE — PROGRESS NOTES
[facility] Rheumatology  Amanda Trevino M.D.  [address] [place] [address]  Office : [phone], Fax: [phone]      2025    Dear ,    Thank you for asking me to assist in the care of your patient Poonam Luciano who was seen in my office on 2025 for evaluation of a positive CARLITOS of 1: 80 and slightly elevated sed rate. I have included the full consultation note below.  I will continue to follow your patient and will forward all future office visit notes to you.  If you have any questions or concerns about any aspect of your patient's care, please do not hesitate to contact me.    I look forward to continuing to care for this patient with you.    Sincerely,    Dr. Trevino          RHEUMATOLOGY INITIAL CONSULTATION NOTE  Date of Visit:  2025 1:53 PM    Patient Information:  Name:  Poonam Luciano  :  1950  Age:  74 y.o.   Gender:  female    PHYSICIAN REQUESTING CONSULTATION: Ms. Lanier    Chief Complaint:  Chief Complaint   Patient presents with    New Patient    Abnormal Test Results     With a positive CARLITOS     History of Present Illness:  Poonam Luciano is a 74 y.o. female who was referred for evaluation of a positive CARLITOS of 1: 80 speckled pattern with an elevated sed rate of 49 and a normal CRP of less than 0.30. Her rheumatoid factor as well as anti-CCP antibody titers were found to be negative.      On talking to the patient today she states that she has had a chronic cough with productive sputum which is clear and was started on Ninj[place]of for symptomatic relief by the physician assistant at the pulmonary office.  Since then patient has been admitted to the hospital with a pleural effusion secondary to symptoms of shortness of breath that she was experiencing.  While she was short of breath she did have some associated chest pain but while in the hospital they also found a blockage in one of her blood vessels to her

## 2025-02-06 ENCOUNTER — OFFICE VISIT (OUTPATIENT)
Age: 75
End: 2025-02-06
Payer: MEDICARE

## 2025-02-06 VITALS
HEART RATE: 64 BPM | BODY MASS INDEX: 28.83 KG/M2 | SYSTOLIC BLOOD PRESSURE: 128 MMHG | DIASTOLIC BLOOD PRESSURE: 62 MMHG | WEIGHT: 143 LBS | HEIGHT: 59 IN

## 2025-02-06 DIAGNOSIS — I10 PRIMARY HYPERTENSION: ICD-10-CM

## 2025-02-06 DIAGNOSIS — Z98.61 STATUS POST PERCUTANEOUS TRANSLUMINAL CORONARY ANGIOPLASTY: ICD-10-CM

## 2025-02-06 DIAGNOSIS — I50.23 ACUTE ON CHRONIC SYSTOLIC CONGESTIVE HEART FAILURE (HCC): Primary | ICD-10-CM

## 2025-02-06 PROCEDURE — G8399 PT W/DXA RESULTS DOCUMENT: HCPCS | Performed by: INTERNAL MEDICINE

## 2025-02-06 PROCEDURE — 1123F ACP DISCUSS/DSCN MKR DOCD: CPT | Performed by: INTERNAL MEDICINE

## 2025-02-06 PROCEDURE — 3017F COLORECTAL CA SCREEN DOC REV: CPT | Performed by: INTERNAL MEDICINE

## 2025-02-06 PROCEDURE — 1036F TOBACCO NON-USER: CPT | Performed by: INTERNAL MEDICINE

## 2025-02-06 PROCEDURE — G8428 CUR MEDS NOT DOCUMENT: HCPCS | Performed by: INTERNAL MEDICINE

## 2025-02-06 PROCEDURE — G8417 CALC BMI ABV UP PARAM F/U: HCPCS | Performed by: INTERNAL MEDICINE

## 2025-02-06 PROCEDURE — 3078F DIAST BP <80 MM HG: CPT | Performed by: INTERNAL MEDICINE

## 2025-02-06 PROCEDURE — 99214 OFFICE O/P EST MOD 30 MIN: CPT | Performed by: INTERNAL MEDICINE

## 2025-02-06 PROCEDURE — 1090F PRES/ABSN URINE INCON ASSESS: CPT | Performed by: INTERNAL MEDICINE

## 2025-02-06 PROCEDURE — 1126F AMNT PAIN NOTED NONE PRSNT: CPT | Performed by: INTERNAL MEDICINE

## 2025-02-06 PROCEDURE — 3074F SYST BP LT 130 MM HG: CPT | Performed by: INTERNAL MEDICINE

## 2025-02-06 RX ORDER — GLIPIZIDE 10 MG/1
10 TABLET, FILM COATED, EXTENDED RELEASE ORAL DAILY
COMMUNITY
Start: 2025-01-31

## 2025-02-06 RX ORDER — FERROUS SULFATE 325(65) MG
325 TABLET, DELAYED RELEASE (ENTERIC COATED) ORAL 2 TIMES DAILY WITH MEALS
COMMUNITY
Start: 2025-01-08 | End: 2026-01-08

## 2025-02-11 ASSESSMENT — ENCOUNTER SYMPTOMS
ABDOMINAL PAIN: 0
SHORTNESS OF BREATH: 0

## 2025-02-11 NOTE — PROGRESS NOTES
Component Value Date    CHOL 34 12/09/2024     No results found for: \"TRIG\"  No results found for: \"HDL\"  No components found for: \"LDLCHOLESTEROL\", \"LDLCALC\"  No results found for: \"VLDL\"  No results found for: \"CHOLHDLRATIO\"    BMP  Lab Results   Component Value Date/Time     01/23/2025 03:09 AM    K 3.7 01/23/2025 03:09 AM     01/23/2025 03:09 AM    CO2 23 01/23/2025 03:09 AM    BUN 8 01/23/2025 03:09 AM    CREATININE 0.74 01/23/2025 03:09 AM    GLUCOSE 149 01/23/2025 03:09 AM    CALCIUM 8.2 01/23/2025 03:09 AM              OUTSIDE RECORDS REVIEW    Records from outside providers have been reviewed and summarized as noted in the HPI, past history and data review sections of this note, and reviewed with patient. .       ASSESSMENT and PLAN    Poonam was seen today for congestive heart failure and follow-up from hospital.    Diagnoses and all orders for this visit:    Acute on chronic systolic congestive heart failure (HCC)  -     [facility] Cardiopulmonary Rehabilitation  -     Echo (TTE) limited (PRN contrast/bubble/strain/3D); Future    Status post percutaneous transluminal coronary angioplasty    Primary hypertension          IMPRESSION:    Ms. Luciano presents today for follow-up.  Underwent stenting of a high-grade lesion to the LAD.  On aspirin and Plavix for platelet inhibition.  Continue guideline directed medical therapy for her reduced EF heart failure.  Plan for repeat echo in 3 months.  Will see after echo or sooner as needed        No follow-ups on file.    Personal time spent with chart review, interview/physical examination, documentation and coordination of care totals 37 minutes.       Thank you for allowing me to participate in this patient's care.  Please call or contact me if there are any questions or concerns regarding the above.      Cheo Rosa III, MD  02/11/25  2:05 PM

## 2025-02-24 ENCOUNTER — TELEPHONE (OUTPATIENT)
Age: 75
End: 2025-02-24

## 2025-02-24 NOTE — TELEPHONE ENCOUNTER
Call from pt stating she has been having some SOB, no others symptoms noted. Pt said she noticed she got SOB while walking around in a store the other day. Has appt for an echo 4/7/25 and f/u appt with Dr Rosa 4/14/25. Dr Rosa is off today, nurse will check with him ignacio. Morning pt said ok and if SOB returns today and is worse then call us back.   2/25/25 nurse called pt to check on her and pt said she is better today and did not have any sob yest. Nurse advised pt keep her appt for echo and with Dr Rosa and if SOB returns to let us know. Pt said ok.

## 2025-02-24 NOTE — TELEPHONE ENCOUNTER
Patient called stating she has the following issues :    SOB  Winded with minimal exertion  Stop and rest with occurrence  Stent implanted 1/22  Onset of 1 - 2 weeks ago

## 2025-02-25 ENCOUNTER — TELEPHONE (OUTPATIENT)
Age: 75
End: 2025-02-25

## 2025-02-26 ENCOUNTER — HOSPITAL ENCOUNTER (OUTPATIENT)
Dept: CARDIAC REHAB | Age: 75
Setting detail: RECURRING SERIES
Discharge: HOME | End: 2025-03-01

## 2025-02-26 ASSESSMENT — PATIENT HEALTH QUESTIONNAIRE - PHQ9
7. TROUBLE CONCENTRATING ON THINGS, SUCH AS READING THE NEWSPAPER OR WATCHING TELEVISION: NOT AT ALL
2. FEELING DOWN, DEPRESSED OR HOPELESS: SEVERAL DAYS
10. IF YOU CHECKED OFF ANY PROBLEMS, HOW DIFFICULT HAVE THESE PROBLEMS MADE IT FOR YOU TO DO YOUR WORK, TAKE CARE OF THINGS AT HOME, OR GET ALONG WITH OTHER PEOPLE: NOT DIFFICULT AT ALL
9. THOUGHTS THAT YOU WOULD BE BETTER OFF DEAD, OR OF HURTING YOURSELF: NOT AT ALL
SUM OF ALL RESPONSES TO PHQ QUESTIONS 1-9: 4
3. TROUBLE FALLING OR STAYING ASLEEP: SEVERAL DAYS
5. POOR APPETITE OR OVEREATING: SEVERAL DAYS
SUM OF ALL RESPONSES TO PHQ QUESTIONS 1-9: 4
6. FEELING BAD ABOUT YOURSELF - OR THAT YOU ARE A FAILURE OR HAVE LET YOURSELF OR YOUR FAMILY DOWN: NOT AT ALL
1. LITTLE INTEREST OR PLEASURE IN DOING THINGS: NOT AT ALL
SUM OF ALL RESPONSES TO PHQ9 QUESTIONS 1 & 2: 1
8. MOVING OR SPEAKING SO SLOWLY THAT OTHER PEOPLE COULD HAVE NOTICED. OR THE OPPOSITE, BEING SO FIGETY OR RESTLESS THAT YOU HAVE BEEN MOVING AROUND A LOT MORE THAN USUAL: NOT AT ALL
SUM OF ALL RESPONSES TO PHQ QUESTIONS 1-9: 4
4. FEELING TIRED OR HAVING LITTLE ENERGY: SEVERAL DAYS
SUM OF ALL RESPONSES TO PHQ QUESTIONS 1-9: 4

## 2025-02-26 NOTE — PROGRESS NOTES
2/26/25    Dear Dr. Almodovar,    Your patient, Ms. Poonam Luciano (1950) has completed her orientation to the [facility] Cardiac Rehab follow her recent PCI procedure. While discussing her home medications, she stated she was NOT taking her glipizide 10mg ER tablet nor ferrous sulfate 325mg  BID. The glipizide was not listed in the meds to continue or on the meds to stop list on her recent hospital discharge EVS. She states \"some doctor\"told her not to take the iron while she was doing \"something else\". Obviously she needs guidance on these medication moving forward.   She was encouraged to contact you also for guidance.     Thank you,    Ambreen Sidhu RN  [facility] Cardiac Rehab

## 2025-02-26 NOTE — PROGRESS NOTES
Dear Dr. Rosa,    Thank you for referring your patient, Ms. Poonam Luciano ( 1950), to the Cardiopulmonary Rehabilitation Program at [facility]. She is a good candidate for the Cardiac Rehab Program and should see improvements with regular participation.    We will be addressing appropriate interventions for modifiable risk factors with your patient during the next 12 weeks. We will contact you with any issues or concerns that may arise, or you can follow your patient's progress through [facility] at any time. A final summary will be sent to you when the program is completed.    Again, thank you for the referral. If we can be of further assistance, please feel free to contact the Cardiopulmonary Rehab staff at [phone].    Sincerely,    EYAL NicholasN, RN  Cardiopulmonary Rehabilitation Nurse Liaison  [facility]

## 2025-03-03 ENCOUNTER — TELEPHONE (OUTPATIENT)
Dept: PULMONOLOGY | Age: 75
End: 2025-03-03

## 2025-03-03 NOTE — TELEPHONE ENCOUNTER
Last seen: 1/16/25  Hx: pleural effusion, cirrohosis, HFrEF, ILD, chronic cough, GERD, DM, TRACY    Contacted patient regarding increased sob, feels like fluid has come back like effusion; has a bad cough as well so not sure of the cause, remembers cardiology telling her one of her medicines can help w/ fluid, but she hasn't called them yet. Advised can have CXR, can call cardiology for possible dose changes on lasix and/or can come in our office for visit w/ ultrasound. Opted for office visit this week, scheduled 3/5 w/ NP.

## 2025-03-05 ENCOUNTER — PREP FOR PROCEDURE (OUTPATIENT)
Dept: PULMONOLOGY | Age: 75
End: 2025-03-05

## 2025-03-05 ENCOUNTER — OFFICE VISIT (OUTPATIENT)
Dept: PULMONOLOGY | Age: 75
End: 2025-03-05
Payer: MEDICARE

## 2025-03-05 VITALS
SYSTOLIC BLOOD PRESSURE: 112 MMHG | BODY MASS INDEX: 28.02 KG/M2 | RESPIRATION RATE: 20 BRPM | HEART RATE: 58 BPM | DIASTOLIC BLOOD PRESSURE: 51 MMHG | WEIGHT: 139 LBS | TEMPERATURE: 97.5 F | OXYGEN SATURATION: 97 % | HEIGHT: 59 IN

## 2025-03-05 DIAGNOSIS — I50.20 HFREF (HEART FAILURE WITH REDUCED EJECTION FRACTION) (HCC): ICD-10-CM

## 2025-03-05 DIAGNOSIS — R76.8 ANA POSITIVE: ICD-10-CM

## 2025-03-05 DIAGNOSIS — J90 RECURRENT PLEURAL EFFUSION ON RIGHT: Primary | ICD-10-CM

## 2025-03-05 PROCEDURE — 99214 OFFICE O/P EST MOD 30 MIN: CPT | Performed by: NURSE PRACTITIONER

## 2025-03-05 PROCEDURE — 1090F PRES/ABSN URINE INCON ASSESS: CPT | Performed by: NURSE PRACTITIONER

## 2025-03-05 PROCEDURE — 1123F ACP DISCUSS/DSCN MKR DOCD: CPT | Performed by: NURSE PRACTITIONER

## 2025-03-05 PROCEDURE — 3074F SYST BP LT 130 MM HG: CPT | Performed by: NURSE PRACTITIONER

## 2025-03-05 PROCEDURE — 1159F MED LIST DOCD IN RCRD: CPT | Performed by: NURSE PRACTITIONER

## 2025-03-05 PROCEDURE — G8427 DOCREV CUR MEDS BY ELIG CLIN: HCPCS | Performed by: NURSE PRACTITIONER

## 2025-03-05 PROCEDURE — 3078F DIAST BP <80 MM HG: CPT | Performed by: NURSE PRACTITIONER

## 2025-03-05 PROCEDURE — 3017F COLORECTAL CA SCREEN DOC REV: CPT | Performed by: NURSE PRACTITIONER

## 2025-03-05 PROCEDURE — G8399 PT W/DXA RESULTS DOCUMENT: HCPCS | Performed by: NURSE PRACTITIONER

## 2025-03-05 PROCEDURE — 1036F TOBACCO NON-USER: CPT | Performed by: NURSE PRACTITIONER

## 2025-03-05 PROCEDURE — G8417 CALC BMI ABV UP PARAM F/U: HCPCS | Performed by: NURSE PRACTITIONER

## 2025-03-05 RX ORDER — SODIUM CHLORIDE 0.9 % (FLUSH) 0.9 %
5-40 SYRINGE (ML) INJECTION EVERY 12 HOURS SCHEDULED
Status: CANCELLED | OUTPATIENT
Start: 2025-03-05

## 2025-03-05 RX ORDER — SODIUM CHLORIDE 9 MG/ML
INJECTION, SOLUTION INTRAVENOUS PRN
Status: CANCELLED | OUTPATIENT
Start: 2025-03-05

## 2025-03-05 RX ORDER — SODIUM CHLORIDE 0.9 % (FLUSH) 0.9 %
5-40 SYRINGE (ML) INJECTION PRN
Status: CANCELLED | OUTPATIENT
Start: 2025-03-05

## 2025-03-05 NOTE — H&P (VIEW-ONLY)
Name:  Poonam Luciano  YOB: 1950   MRN: [id number]      Office Visit: 3/5/2025        ASSESSMENT AND PLAN:  (Medical Decision Making)    Impression: 74 y.o. female seen today as a work in for increased shortness of breath    1. Recurrent pleural effusion on right  --Unfortunately has a large right-sided pleural effusion.  Last pleural effusion was lymphocyte predominant, 90%.  Positive CARLITOS in the past with some mild interstitial changes along with heart failure with reduced EF.  Currently on Lasix as needed and spironolactone.  Recently started on Entresto.  -- Need to repeat thoracentesis.  Recent stent placement and is currently on Plavix, so unable to stop this.  Explained risks versus benefit with patient given symptoms.  Will also reach out to her cardiologist and let them know.  Unfortunately with lymphocytic predominance, need to rule out/in other issues.  Negative cytology in the past, but may need flow on fluid this time.    -- Encouraged to use Lasix over the weekend every day until procedure.    2. CARLITOS positive  --negative RF and CCP and no joint concerns.  Seen by rheumatology not a concern for an autoimmune issue.    3. HFrEF (heart failure with reduced ejection fraction) ([facility])  --Strongly encouraged follow-up with cardiology.  Will reach out to Dr. Rosa about dosing and refills on the Entresto for now.  Looks like she has a follow-up echocardiogram in April    No orders of the defined types were placed in this encounter.    No orders of the defined types were placed in this encounter.    Follow-up and Dispositions    Return for per dex, monisha Catalan, APRN - CNP    No specialty comments available.    Collaborating physician is Duke Nance MD  _________________________________________________________________________    HISTORY OF PRESENT ILLNESS:    Ms. Poonam Luciano is a 74 y.o. female who is seen at [facility] today for  Cough   Ms Jakub is here

## 2025-03-05 NOTE — PROGRESS NOTES
today as a work in for increased shortness of breath and cough.  History of recurrent pleural effusions requiring thoracentesis several times, heart failure with  reduced EF, liver dysfunction without ascites, seasonal allergies.  Last thoracentesis was 1/21 with a lymphocyte predominance at 90%.  Cytology was negative, cultures negative.  History of a positive CARLITOS, but negative RF and CCP.  Has seen rheumatology in the past and no concerns for an autoimmune issue though she did have an elevated ESR as well.  Today reports for concerns of increased fluid again causing her cough.  She also reports some shortness of breath with exertion.  She did have a stent placed in January during hospitalization and is on Plavix.  She was recently started on Entresto as well.  She has Lasix but she does not take this unless certain parameters are met which include shortness of breath, edema, weight gain.  Denies fevers, chills.  No recent episodes of pneumonia.    REVIEW OF SYSTEMS: 10 point review of systems is negative except as reported in HPI.    PHYSICAL EXAM: Body mass index is 28.07 kg/m².  Vitals:    03/05/25 1358   BP: (!) 112/51   Site: Left Upper Arm   Position: Sitting   Pulse: 58   Resp: 20   Temp: 97.5 °F (36.4 °C)   TempSrc: Infrared   SpO2: 97%   Weight: 63 kg (139 lb)   Height: 1.499 m (4' 11\")         General:   Alert, cooperative, no distress, appears stated age.        Eyes:   Conjunctivae/corneas clear. PERRL        Mouth/Throat:  Lips, mucosa, and tongue normal. Teeth and gums normal.        Lungs:   Diminished on the right, clear on the left on room air, saturations 97%     Heart:   Regular rate and rhythm, S1, S2 normal, no murmur, click, rub or gallop.     Abdomen:    Soft, non-tender.     Extremities:  Extremities normal, atraumatic, no cyanosis or edema.     Skin:  Skin color normal. No rashes or lesions     Neurologic:  A&Ox3     DIAGNOSTIC TESTS:

## 2025-03-07 NOTE — PROGRESS NOTES
RN called Pt to confirm appointment time of 1300, arrival time 1200, location,  requirement, and instructions for registration at the hospital.  No answer. VM left and [employer] message sent.

## 2025-03-10 ENCOUNTER — HOSPITAL ENCOUNTER (OUTPATIENT)
Age: 75
Discharge: HOME | End: 2025-03-10
Attending: INTERNAL MEDICINE | Admitting: INTERNAL MEDICINE
Payer: MEDICARE

## 2025-03-10 ENCOUNTER — TELEPHONE (OUTPATIENT)
Dept: PULMONOLOGY | Age: 75
End: 2025-03-10

## 2025-03-10 VITALS
HEART RATE: 91 BPM | OXYGEN SATURATION: 100 % | RESPIRATION RATE: 18 BRPM | DIASTOLIC BLOOD PRESSURE: 65 MMHG | SYSTOLIC BLOOD PRESSURE: 124 MMHG

## 2025-03-10 PROCEDURE — C1729 CATH, DRAINAGE: HCPCS | Performed by: INTERNAL MEDICINE

## 2025-03-10 PROCEDURE — 3609027000 HC THORACENTESIS W/ULTRASOUND: Performed by: INTERNAL MEDICINE

## 2025-03-10 PROCEDURE — 2709999900 HC NON-CHARGEABLE SUPPLY: Performed by: INTERNAL MEDICINE

## 2025-03-10 PROCEDURE — 32555 ASPIRATE PLEURA W/ IMAGING: CPT | Performed by: INTERNAL MEDICINE

## 2025-03-10 RX ORDER — SODIUM CHLORIDE 0.9 % (FLUSH) 0.9 %
5-40 SYRINGE (ML) INJECTION EVERY 12 HOURS SCHEDULED
Status: DISCONTINUED | OUTPATIENT
Start: 2025-03-10 | End: 2025-03-10 | Stop reason: HOSPADM

## 2025-03-10 RX ORDER — SODIUM CHLORIDE 9 MG/ML
INJECTION, SOLUTION INTRAVENOUS PRN
Status: DISCONTINUED | OUTPATIENT
Start: 2025-03-10 | End: 2025-03-10 | Stop reason: HOSPADM

## 2025-03-10 RX ORDER — SODIUM CHLORIDE 0.9 % (FLUSH) 0.9 %
5-40 SYRINGE (ML) INJECTION PRN
Status: DISCONTINUED | OUTPATIENT
Start: 2025-03-10 | End: 2025-03-10 | Stop reason: HOSPADM

## 2025-03-10 ASSESSMENT — PAIN - FUNCTIONAL ASSESSMENT: PAIN_FUNCTIONAL_ASSESSMENT: NONE - DENIES PAIN

## 2025-03-10 NOTE — INTERVAL H&P NOTE
Update History & Physical    The patient's History and Physical of March 5, Thoracentesis was reviewed with the patient and I examined the patient. There was no change. The surgical site was confirmed by the patient and me.     Plan: The risks, benefits, expected outcome, and alternative to the recommended procedure have been discussed with the patient. Patient understands and wants to proceed with the procedure.     Electronically signed by Buffy Holguin MD on 3/10/2025 at 1:42 PM

## 2025-03-10 NOTE — TELEPHONE ENCOUNTER
Patient called this morning to inquire if it was ok to take her morning medications and if it was ok to eat and drink before her procedure.  I advised her that per Tete Catalan NP notes, we did not stop her Plavix RX for this procedure so it was ok to take all her medications as usual and since this procedure did not require general anesthesia, she was ok to eat and drink.  Patient is nervous about the procedure today due to her blood thinners, and asked if that put her at higher risk for bleeding.  I informed her that it did, but that the provider was aware of these risks and was monitoring her.  Advised her that she could discuss her concerns with the provider prior to the procedure this afternoon but that based on her previous visit notes, it appeared that the benefits out weighed the risks.  Patient had no further questions at this time and will check in today at 1300.

## 2025-03-10 NOTE — PROCEDURES
PROCEDURE:  THERAPEUTIC THORACENTESIS     PRE-OP DIAGNOSIS:  Right PLEURAL EFFUSION    POST-OP DIAGNOSIS:  Right PLEURAL EFFUSION    VOLUME REMOVED:  900 cc    ANESTHESIA:  LOCAL ANESTHESIA WITH 1% LIDOCAINE 10 CC TOTAL.    CHEST ULTRASOUND FINDINGS:    A Turbo-M, Sonosite ultrasound with a 5-16 mHz probe was used to image the chest and localize the pleural effusion on the right chest.    A moderate anechoic space was seen on the right consistent with an uncomplicated pleural effusion    DESCRIPTION OF PROCEDURE:    After obtaining informed consent and localizing the safest location for thoracentesis, the  9th intercostal space was marked with a blunt, plastic needle cap in the mid scapular line.    An [employer] AK-0100 Pleral-Seal thoracentesis kit was used to perform the procedure.    The skin was cleansed with the supplied chlorhexidine swab and then draped in the usual fashion.    Using the previously marked location as a guide, a 22 G 1.5 inch needle was used to inject 1% lidocaine into the skin and subcutaneous tissue, as well as onto the underlying rib and inter-costal muscles.  Pleural fluid was aspirated to assure proper location and additional lidocaine was injected into the pleural space prior to removing the anesthesia needle.    A 3mm incision was then made with the supplied scalpel in the usual fashion to facilitate the insertion of the thoracentesis needle.    The needle with an 8 Hungarian thoracentesis catheter was then introduced into the chest through the previously made incision in the usual fashion, the rib localized with the needle, and the catheter then marched over the rib into the pleural space.    After aspirating fluid, the thoracentesis catheter was then placed into the chest using the needle itself as a trocar.  The needle was then removed and the catheter was attached to the supplied tubing without complication.    900 cc of yellow fluid was aspirated and sent for analysis.    Fluid was

## 2025-03-11 ENCOUNTER — TELEPHONE (OUTPATIENT)
Dept: CARDIAC REHAB | Age: 75
End: 2025-03-11

## 2025-03-25 ENCOUNTER — TELEPHONE (OUTPATIENT)
Dept: CARDIAC REHAB | Age: 75
End: 2025-03-25

## 2025-03-25 NOTE — TELEPHONE ENCOUNTER
[facility] and was saying patient was out of her Entresto. I did let them know looked like Pulmonary subscribed this to the patient. ~thank you~

## 2025-03-25 NOTE — CARDIO/PULMONARY
Patient arrived for first day of exercise at cardiac rehab post PCI on 1/22/2025 and thoracentesis on 3/10/25. Patient has developed a non productive cough starting yesterday and more SOB with activity. Lungs clear to auscultation. No exercise performed today. Recommended trying to see her PCP if she doesn't start to feel better or go to urgent care if needed. [facility] Cardiology contacted about patient being out of her Entresto. Patient is also out of Protonix and she will contact her PCP regarding this prescription.

## 2025-03-26 NOTE — TELEPHONE ENCOUNTER
Patient was prescribed Entresto 24 MG-26 MG #30 NR on 1/6 by physician in [place]. Are we taking over prescribing this medication? Patient does have HFrEF. Please advise

## 2025-03-28 NOTE — TELEPHONE ENCOUNTER
Requested Prescriptions     Pending Prescriptions Disp Refills    sacubitril-valsartan (ENTRESTO) 24-26 MG per tablet 60 tablet 3     Sig: Take 1 tablet by mouth 2 times daily

## 2025-04-01 ENCOUNTER — HOSPITAL ENCOUNTER (OUTPATIENT)
Dept: CARDIAC REHAB | Age: 75
Setting detail: RECURRING SERIES
Discharge: HOME | End: 2025-04-04
Payer: MEDICARE

## 2025-04-01 ENCOUNTER — HOSPITAL ENCOUNTER (OUTPATIENT)
Dept: CARDIAC REHAB | Age: 75
Setting detail: RECURRING SERIES
Discharge: HOME | End: 2025-03-28

## 2025-04-01 PROCEDURE — 93798 PHYS/QHP OP CAR RHAB W/ECG: CPT

## 2025-04-02 ENCOUNTER — APPOINTMENT (OUTPATIENT)
Dept: CARDIAC REHAB | Age: 75
End: 2025-04-02
Payer: MEDICARE

## 2025-04-03 ENCOUNTER — APPOINTMENT (OUTPATIENT)
Dept: CARDIAC REHAB | Age: 75
End: 2025-04-03
Payer: MEDICARE

## 2025-04-09 ENCOUNTER — APPOINTMENT (OUTPATIENT)
Dept: CARDIAC REHAB | Age: 75
End: 2025-04-09
Payer: MEDICARE

## 2025-04-10 ENCOUNTER — HOSPITAL ENCOUNTER (OUTPATIENT)
Dept: CARDIAC REHAB | Age: 75
Setting detail: RECURRING SERIES
Discharge: HOME | End: 2025-04-13
Payer: MEDICARE

## 2025-04-10 PROCEDURE — 93798 PHYS/QHP OP CAR RHAB W/ECG: CPT

## 2025-04-14 ENCOUNTER — APPOINTMENT (OUTPATIENT)
Dept: CARDIAC REHAB | Age: 75
End: 2025-04-14
Payer: MEDICARE

## 2025-04-14 ENCOUNTER — OFFICE VISIT (OUTPATIENT)
Age: 75
End: 2025-04-14
Payer: MEDICARE

## 2025-04-14 VITALS
SYSTOLIC BLOOD PRESSURE: 134 MMHG | HEIGHT: 59 IN | HEART RATE: 60 BPM | BODY MASS INDEX: 28.56 KG/M2 | DIASTOLIC BLOOD PRESSURE: 62 MMHG | WEIGHT: 141.7 LBS

## 2025-04-14 DIAGNOSIS — I50.20 HFREF (HEART FAILURE WITH REDUCED EJECTION FRACTION) (HCC): ICD-10-CM

## 2025-04-14 DIAGNOSIS — I25.10 CAD S/P PERCUTANEOUS CORONARY ANGIOPLASTY: Primary | ICD-10-CM

## 2025-04-14 DIAGNOSIS — I10 PRIMARY HYPERTENSION: ICD-10-CM

## 2025-04-14 DIAGNOSIS — Z98.61 CAD S/P PERCUTANEOUS CORONARY ANGIOPLASTY: Primary | ICD-10-CM

## 2025-04-14 PROCEDURE — 3075F SYST BP GE 130 - 139MM HG: CPT | Performed by: INTERNAL MEDICINE

## 2025-04-14 PROCEDURE — 1126F AMNT PAIN NOTED NONE PRSNT: CPT | Performed by: INTERNAL MEDICINE

## 2025-04-14 PROCEDURE — G8417 CALC BMI ABV UP PARAM F/U: HCPCS | Performed by: INTERNAL MEDICINE

## 2025-04-14 PROCEDURE — G8399 PT W/DXA RESULTS DOCUMENT: HCPCS | Performed by: INTERNAL MEDICINE

## 2025-04-14 PROCEDURE — 1123F ACP DISCUSS/DSCN MKR DOCD: CPT | Performed by: INTERNAL MEDICINE

## 2025-04-14 PROCEDURE — G8428 CUR MEDS NOT DOCUMENT: HCPCS | Performed by: INTERNAL MEDICINE

## 2025-04-14 PROCEDURE — 99214 OFFICE O/P EST MOD 30 MIN: CPT | Performed by: INTERNAL MEDICINE

## 2025-04-14 PROCEDURE — 1090F PRES/ABSN URINE INCON ASSESS: CPT | Performed by: INTERNAL MEDICINE

## 2025-04-14 PROCEDURE — 3078F DIAST BP <80 MM HG: CPT | Performed by: INTERNAL MEDICINE

## 2025-04-14 PROCEDURE — 3017F COLORECTAL CA SCREEN DOC REV: CPT | Performed by: INTERNAL MEDICINE

## 2025-04-14 PROCEDURE — 1036F TOBACCO NON-USER: CPT | Performed by: INTERNAL MEDICINE

## 2025-04-14 RX ORDER — VALSARTAN 80 MG/1
80 TABLET ORAL DAILY
Qty: 90 TABLET | Refills: 1 | Status: SHIPPED | OUTPATIENT
Start: 2025-04-14

## 2025-04-14 RX ORDER — LOSARTAN POTASSIUM 100 MG/1
100 TABLET ORAL DAILY
COMMUNITY
Start: 2025-03-15

## 2025-04-16 ENCOUNTER — HOSPITAL ENCOUNTER (OUTPATIENT)
Dept: CARDIAC REHAB | Age: 75
Setting detail: RECURRING SERIES
Discharge: HOME | End: 2025-04-19
Payer: MEDICARE

## 2025-04-16 PROCEDURE — 93798 PHYS/QHP OP CAR RHAB W/ECG: CPT

## 2025-04-17 ENCOUNTER — HOSPITAL ENCOUNTER (OUTPATIENT)
Dept: CARDIAC REHAB | Age: 75
Setting detail: RECURRING SERIES
Discharge: HOME | End: 2025-04-20
Payer: MEDICARE

## 2025-04-17 PROCEDURE — 93798 PHYS/QHP OP CAR RHAB W/ECG: CPT

## 2025-04-21 ENCOUNTER — HOSPITAL ENCOUNTER (OUTPATIENT)
Dept: CARDIAC REHAB | Age: 75
Setting detail: RECURRING SERIES
Discharge: HOME | End: 2025-04-24
Payer: MEDICARE

## 2025-04-21 PROCEDURE — 93798 PHYS/QHP OP CAR RHAB W/ECG: CPT

## 2025-04-21 ASSESSMENT — ENCOUNTER SYMPTOMS
ABDOMINAL PAIN: 0
SHORTNESS OF BREATH: 1

## 2025-04-21 NOTE — PROGRESS NOTES
[facility] CARDIOLOGY  [address] [place] [address], [address]  [place] [address]  PHONE: [phone]      25    NAME:  Poonam Luciano  : 1950  MRN: [id number]         SUBJECTIVE:   Poonam Luciano is a 74 y.o. female seen for a follow up visit regarding the following:     Chief Complaint   Patient presents with    Congestive Heart Failure    Shortness of Breath            HPI:  Follow up  Congestive Heart Failure and Shortness of Breath   .    Ms. Luciano presents today for follow-up.  She is now on aspirin and Plavix for antiplatelet therapy.  She denies chest pain, shortness of breath, orthopnea, PND, palpitations, syncope lower extremity swelling.  Reports compliance with medications including her antiplatelet therapy.  No bleeding issues.  Repeat echo showed improvement of her LV function.  Unable to afford Entresto going forward.    Congestive Heart Failure  Associated symptoms include shortness of breath. Pertinent negatives include no abdominal pain.   Shortness of Breath  Pertinent negatives include no abdominal pain or fever.           Cardiac Medications       Potassium Sparing Diuretics       spironolactone (ALDACTONE) 25 MG tablet Take 1 tablet by mouth daily       Nitrates       nitroGLYCERIN (NITROSTAT) 0.4 MG SL tablet Place 1 tablet under the tongue every 5 minutes as needed for Chest pain up to max of 3 total doses. If no relief after 1 dose, call 911.       Beta Blockers Cardio-Selective       metoprolol succinate (TOPROL XL) 25 MG extended release tablet Take 1 tablet by mouth daily       Loop Diuretics       furosemide (LASIX) 20 MG tablet Take 1 tablet by mouth daily as needed (Weight gain of 2 pounds, shortness of breath, lower extremity swelling.)       Angiotensin II Receptor Antagonists       losartan (COZAAR) 100 MG tablet Take 1 tablet by mouth daily     valsartan (DIOVAN) 80 MG tablet Take 1 tablet by mouth daily       Cardiovascular Agents Misc. -

## 2025-04-24 ENCOUNTER — TELEPHONE (OUTPATIENT)
Dept: CARDIAC REHAB | Age: 75
End: 2025-04-24

## 2025-04-24 NOTE — TELEPHONE ENCOUNTER
Returned phone call about missing cardiac rehab due to sickness. Patient has been sick with cold symptoms and SOB since yesterday. States she had to miss work today. Denies swelling or feelings like with her previous pleural effusions. Audibly wheezing over the phone. Advised to try to see PCP or urgent care today.

## 2025-04-28 ENCOUNTER — HOSPITAL ENCOUNTER (OUTPATIENT)
Dept: CARDIAC REHAB | Age: 75
Setting detail: RECURRING SERIES
Discharge: HOME | End: 2025-05-01
Payer: MEDICARE

## 2025-04-28 PROCEDURE — 93798 PHYS/QHP OP CAR RHAB W/ECG: CPT

## 2025-04-30 ENCOUNTER — HOSPITAL ENCOUNTER (OUTPATIENT)
Dept: CARDIAC REHAB | Age: 75
Setting detail: RECURRING SERIES
Discharge: HOME | End: 2025-05-03
Payer: MEDICARE

## 2025-04-30 PROCEDURE — 93798 PHYS/QHP OP CAR RHAB W/ECG: CPT

## 2025-05-02 ENCOUNTER — TELEPHONE (OUTPATIENT)
Dept: PULMONOLOGY | Age: 75
End: 2025-05-02

## 2025-05-02 NOTE — TELEPHONE ENCOUNTER
Patient says she has had some trouble with her breathing for the last couple wks . She was given Z-lisandra and prednisone . She is done with the meds . She says she doesn't feel like it help.

## 2025-05-05 ENCOUNTER — TELEPHONE (OUTPATIENT)
Dept: CARDIAC REHAB | Age: 75
End: 2025-05-05

## 2025-05-05 ENCOUNTER — TELEPHONE (OUTPATIENT)
Dept: PULMONOLOGY | Age: 75
End: 2025-05-05

## 2025-05-05 NOTE — TELEPHONE ENCOUNTER
Patient called and states she had a fall this past weekend while on a trip to [place]. Has humerus fx. Has appointment scheduled with local ortho on Wednesday. Plans to call us after appointment to discuss plan regarding cardiac rehab.

## 2025-05-05 NOTE — TELEPHONE ENCOUNTER
Spoke with patients daughter in law Tiffanie . Patient fell in [place] and has broken her shoulder she is back here . She is going to need surgery asap. They found fluid in her lungs and will have to have a drain before the surgery can be done

## 2025-05-06 ENCOUNTER — HOSPITAL ENCOUNTER (OUTPATIENT)
Age: 75
Discharge: HOME | End: 2025-05-06
Attending: STUDENT IN AN ORGANIZED HEALTH CARE EDUCATION/TRAINING PROGRAM | Admitting: STUDENT IN AN ORGANIZED HEALTH CARE EDUCATION/TRAINING PROGRAM
Payer: MEDICARE

## 2025-05-06 VITALS
DIASTOLIC BLOOD PRESSURE: 79 MMHG | SYSTOLIC BLOOD PRESSURE: 183 MMHG | BODY MASS INDEX: 28.62 KG/M2 | HEIGHT: 59 IN | OXYGEN SATURATION: 99 % | TEMPERATURE: 98.7 F | RESPIRATION RATE: 21 BRPM | HEART RATE: 69 BPM

## 2025-05-06 PROCEDURE — C1729 CATH, DRAINAGE: HCPCS | Performed by: STUDENT IN AN ORGANIZED HEALTH CARE EDUCATION/TRAINING PROGRAM

## 2025-05-06 PROCEDURE — 3609027000 HC THORACENTESIS W/ULTRASOUND: Performed by: STUDENT IN AN ORGANIZED HEALTH CARE EDUCATION/TRAINING PROGRAM

## 2025-05-06 PROCEDURE — 32555 ASPIRATE PLEURA W/ IMAGING: CPT | Performed by: STUDENT IN AN ORGANIZED HEALTH CARE EDUCATION/TRAINING PROGRAM

## 2025-05-06 PROCEDURE — 76604 US EXAM CHEST: CPT | Performed by: STUDENT IN AN ORGANIZED HEALTH CARE EDUCATION/TRAINING PROGRAM

## 2025-05-06 PROCEDURE — 2709999900 HC NON-CHARGEABLE SUPPLY: Performed by: STUDENT IN AN ORGANIZED HEALTH CARE EDUCATION/TRAINING PROGRAM

## 2025-05-06 ASSESSMENT — PAIN DESCRIPTION - LOCATION
LOCATION: ARM

## 2025-05-06 ASSESSMENT — PAIN SCALES - GENERAL
PAINLEVEL_OUTOF10: 3

## 2025-05-06 ASSESSMENT — PAIN - FUNCTIONAL ASSESSMENT: PAIN_FUNCTIONAL_ASSESSMENT: 0-10

## 2025-05-06 ASSESSMENT — PAIN DESCRIPTION - ORIENTATION: ORIENTATION: LEFT

## 2025-05-06 NOTE — H&P
PULMONARY PREPROCEDURE H&P           5/6/2025    Poonam Joiner Ankita                        Date of Admission:  5/6/2025    HPI  74 y.o. female here to follow up on recurrent pleural effusion on right, post hospitalization in January.  She has a history of portal hypertension, TRACY [facility] by [facility], and HFrEF.     Diagnostic Review:  No recent imaging available    Review of Systems: Negative 14 point review of systems other than what is noted in HPI    Blood pressure (!) 173/67, pulse 69, temperature 98.7 °F (37.1 °C), temperature source Oral, height 1.499 m (4' 11\"), SpO2 99%. No intake or output data in the 24 hours ending 05/06/25 1333    24 Hour I/O   No intake/output data recorded.   Current Outpatient Medications   Medication Instructions    albuterol sulfate HFA (PROVENTIL;VENTOLIN;PROAIR) 108 (90 Base) MCG/ACT inhaler 2 puffs, EVERY 6 HOURS PRN    aspirin 81 mg, Oral, DAILY    clopidogrel (PLAVIX) 75 mg, Oral, DAILY    Empagliflozin (JARDIANCE PO) 10 mg    famotidine (PEPCID) 20 mg, EVERY BEDTIME    ferrous sulfate (FE TABS 325) 325 mg, 2 TIMES DAILY WITH MEALS    fexofenadine (ALLEGRA) 30 MG/5ML suspension Take by mouth    fluticasone (FLONASE) 50 MCG/ACT nasal spray 2 sprays, Nasal, DAILY    furosemide (LASIX) 20 mg, Oral, DAILY PRN    glipiZIDE (GLUCOTROL XL) 10 mg, DAILY    hyoscyamine (LEVSIN/SL) 0.125 mg, EVERY 6 HOURS PRN    levothyroxine (SYNTHROID) 112 MCG tablet TAKE 1 TABLET (112 MCG TOTAL) BY MOUTH BEFORE BREAKFAST. 1 EVERY DAY    losartan (COZAAR) 100 mg, DAILY    metFORMIN (GLUCOPHAGE-XR) 500 mg, Oral, DAILY WITH BREAKFAST    metoprolol succinate (TOPROL XL) 25 mg, Oral, DAILY    nitroGLYCERIN (NITROSTAT) 0.4 mg, SubLINGual, EVERY 5 MIN PRN, up to max of 3 total doses. If no relief after 1 dose, call 911.    pantoprazole (PROTONIX) 40 mg, DAILY    potassium chloride (K-TAB) 20 MEQ TBCR extended release tablet 20 mEq, DAILY    Sacubitril-Valsartan (ENTRESTO PO) Take by

## 2025-05-06 NOTE — PROGRESS NOTES
Pt sat up on side of bed for thoracentesis. Consent obtained.  Time out performed. Pts vitals monitored throughout procedure.  Bilateral ultrasound done and pic taken of pleural fluid.  Only enough fluid to perform thoracentesis on R at this time per md.  ~1100 ml yellow pleural fluid from R.  Pt tolerated procedure well with no adverse rxn.  No specimens sent to the lab per md order.  Site dressed appropriately and discharge instructions reviewed with pt and family.  R Lung sliding done and ultrasound findings reviewed by MD.  Pt given written discharge instructions including follow-up appointment,  potential side effects and physical changes to be aware of, and physician contact number.  Pt d/c'ed via ambulatory to son's care. Pt and family verbalized understanding of discharge instructions.  MD spoke with pt and family.

## 2025-05-06 NOTE — TELEPHONE ENCOUNTER
Incoming call from Tiffanie CALDWELL listed on RUDY.  She reports that patient fell on Sunday at [place] and went to ED.  Per EC, patient was told at ED, that fluid was present on lung that needed to be drained.  EC, reports that patient is scheduled with orthopedic appointment tomorrow for shoulder evaluation injured in fall on Sun.  Patient is wheezing, increased SOB. She reports that patient needs thoracentesis today in event shoulder surgery is scheduled.     Last thoracentesis was 3/10/2025 by Dr Holguin 900 cc from R, 1/21/2025 Dr Pappas 1350 cc.  Patient is on Plavix and cardiology will not allow hold. I have spoken with Dr Pappas who approves thoracentesis evaluation today in endoscopy.  I have spoken with River CHANEY, patient is added on for procedure today at 130.

## 2025-05-06 NOTE — DISCHARGE INSTRUCTIONS
Thoracentesis: What to Expect at Home  Your Recovery  Thoracentesis (say \"zjoq-cn-wgm-PERI-sis\") is a procedure to remove fluid from the space between the lungs and the chest wall (pleural space). This procedure may also be called a \"chest tap.\" It's normal to have a small amount of fluid in the pleural space. But too much fluid can build up because of problems such as infection, heart failure, or lung cancer. The procedure may have been done to help with shortness of breath and pain caused by the fluid buildup. Or you may have had this procedure so the doctor could test the fluid to find the cause of the buildup.  Your chest may be sore where the doctor put the needle or catheter into your skin (the puncture site). This usually gets better after a day or two. You can go back to work or your normal activities as soon as you feel up to it.  If a large amount of pleural fluid was removed during the procedure, you will probably be able to breathe more easily.  If more pleural fluid collects and needs to be removed, another thoracentesis may be done later.  This care sheet gives you a general idea about how long it will take for you to recover. But each person recovers at a different pace. Follow the steps below to feel better as quickly as possible.  How can you care for yourself at home?  Activity    Rest when you feel tired. Getting enough sleep will help you recover.     Avoid strenuous activities, such as bicycle riding, jogging, weight lifting, or aerobic exercise, until your doctor says it is okay.     You may shower. Do not take a bath until the puncture site has healed, or until your doctor tells you it is okay.     Ask your doctor when you can drive again.     You may need to take 1 or 2 days off from work. It depends on the type of work you do and how you feel.   Diet    You can eat your normal diet.     Drink plenty of fluids (unless your doctor tells you not to).   Medicines    Your doctor will tell you if

## 2025-05-07 NOTE — PROCEDURES
PROCEDURE NOTE  Date: 5/7/2025   Name: Poonam Luciano  YOB: 1950    Procedures    PROCEDURE:  Therapeutic Thoracentesis  CPT 65620    PRE-PROCEDURE DIAGNOSIS:  RIGHT  Pleural Effusion    VOLUME REMOVED:  1100cc from the right pleural space    ANESTHESIA:  Local Anesthesia with 10cc of 1% Lidocaine    CHEST ULTRASOUND FINDINGS:  A Turbo-M, Sonosite ultrasound with a 5-16 mHz probe was used to image the chest and localize the pleural effusion     A moderate anechoic space was seen consistent with an uncomplicated pleural effusion on the right chest    DESCRIPTION OF PROCEDURE:  After obtaining informed consent and localizing the safest location for thoracentesis, the 6th intercostal space was marked with a blunt, plastic needle cap in the mid scapular line.  An [employer] AK-0100 Pleural-Seal thoracentesis kit was used to perform the procedure.    The skin was cleansed with the supplied chlorhexidine swab and then draped in the usual fashion.    Using the previously marked location as a guide, a 22 G 1.5 inch needle was used to inject 1% lidocaine into the skin and subcutaneous tissue, as well as onto the underlying rib and inter-costal muscles.  Pleural fluid was aspirated to assure proper location and additional lidocaine was injected into the pleural space prior to removing the anesthesia needle.    A 3mm incision was then made with the supplied scalpel in a horizontal fashion to facilitate the insertion of the thoracentesis needle.    The needle with an 8 Stateless thoracentesis catheter was then introduced into the chest through the previously made incision and the catheter then introduced just over the rib into the pleural space.    After aspirating fluid, the thoracentesis catheter was then placed into the chest using the needle itself as a trocar.  The needle was then removed and the catheter was attached to the supplied tubing without complication using manual drainage.    The patient

## 2025-05-13 ENCOUNTER — TELEPHONE (OUTPATIENT)
Dept: CARDIAC REHAB | Age: 75
End: 2025-05-13

## 2025-05-13 NOTE — TELEPHONE ENCOUNTER
Patient called to let us know she wouldn't be able to attend cardiac rehab for awhile. Has broken arm from recent fall along with recent thoracentesis. Reports breathing has improved. Has follow up appt with orthopedic doctor on 5/21. Will hold any exercise until that time.

## 2025-05-16 ENCOUNTER — OFFICE VISIT (OUTPATIENT)
Dept: PULMONOLOGY | Age: 75
End: 2025-05-16
Payer: MEDICARE

## 2025-05-16 VITALS
WEIGHT: 135 LBS | RESPIRATION RATE: 16 BRPM | BODY MASS INDEX: 27.21 KG/M2 | TEMPERATURE: 97.2 F | HEART RATE: 48 BPM | DIASTOLIC BLOOD PRESSURE: 46 MMHG | OXYGEN SATURATION: 99 % | SYSTOLIC BLOOD PRESSURE: 118 MMHG | HEIGHT: 59 IN

## 2025-05-16 DIAGNOSIS — J90 RECURRENT PLEURAL EFFUSION ON RIGHT: Primary | ICD-10-CM

## 2025-05-16 DIAGNOSIS — K76.6 PORTAL HYPERTENSION (HCC): ICD-10-CM

## 2025-05-16 DIAGNOSIS — R05.3 CHRONIC COUGH: ICD-10-CM

## 2025-05-16 DIAGNOSIS — I50.20 HFREF (HEART FAILURE WITH REDUCED EJECTION FRACTION) (HCC): ICD-10-CM

## 2025-05-16 DIAGNOSIS — J84.9 ILD (INTERSTITIAL LUNG DISEASE) (HCC): ICD-10-CM

## 2025-05-16 PROCEDURE — 3017F COLORECTAL CA SCREEN DOC REV: CPT | Performed by: STUDENT IN AN ORGANIZED HEALTH CARE EDUCATION/TRAINING PROGRAM

## 2025-05-16 PROCEDURE — G8399 PT W/DXA RESULTS DOCUMENT: HCPCS | Performed by: STUDENT IN AN ORGANIZED HEALTH CARE EDUCATION/TRAINING PROGRAM

## 2025-05-16 PROCEDURE — 1090F PRES/ABSN URINE INCON ASSESS: CPT | Performed by: STUDENT IN AN ORGANIZED HEALTH CARE EDUCATION/TRAINING PROGRAM

## 2025-05-16 PROCEDURE — 1159F MED LIST DOCD IN RCRD: CPT | Performed by: STUDENT IN AN ORGANIZED HEALTH CARE EDUCATION/TRAINING PROGRAM

## 2025-05-16 PROCEDURE — G8427 DOCREV CUR MEDS BY ELIG CLIN: HCPCS | Performed by: STUDENT IN AN ORGANIZED HEALTH CARE EDUCATION/TRAINING PROGRAM

## 2025-05-16 PROCEDURE — 99214 OFFICE O/P EST MOD 30 MIN: CPT | Performed by: STUDENT IN AN ORGANIZED HEALTH CARE EDUCATION/TRAINING PROGRAM

## 2025-05-16 PROCEDURE — G8417 CALC BMI ABV UP PARAM F/U: HCPCS | Performed by: STUDENT IN AN ORGANIZED HEALTH CARE EDUCATION/TRAINING PROGRAM

## 2025-05-16 PROCEDURE — 3078F DIAST BP <80 MM HG: CPT | Performed by: STUDENT IN AN ORGANIZED HEALTH CARE EDUCATION/TRAINING PROGRAM

## 2025-05-16 PROCEDURE — 1123F ACP DISCUSS/DSCN MKR DOCD: CPT | Performed by: STUDENT IN AN ORGANIZED HEALTH CARE EDUCATION/TRAINING PROGRAM

## 2025-05-16 PROCEDURE — 1126F AMNT PAIN NOTED NONE PRSNT: CPT | Performed by: STUDENT IN AN ORGANIZED HEALTH CARE EDUCATION/TRAINING PROGRAM

## 2025-05-16 PROCEDURE — 1036F TOBACCO NON-USER: CPT | Performed by: STUDENT IN AN ORGANIZED HEALTH CARE EDUCATION/TRAINING PROGRAM

## 2025-05-16 PROCEDURE — 3074F SYST BP LT 130 MM HG: CPT | Performed by: STUDENT IN AN ORGANIZED HEALTH CARE EDUCATION/TRAINING PROGRAM

## 2025-05-16 RX ORDER — TRAMADOL HYDROCHLORIDE 50 MG/1
50 TABLET ORAL EVERY 8 HOURS PRN
COMMUNITY
Start: 2025-05-07

## 2025-05-16 RX ORDER — HYDROCODONE BITARTRATE AND HOMATROPINE METHYLBROMIDE ORAL SOLUTION 5; 1.5 MG/5ML; MG/5ML
5 LIQUID ORAL EVERY 4 HOURS PRN
Qty: 150 ML | Refills: 0 | Status: SHIPPED | OUTPATIENT
Start: 2025-05-16 | End: 2025-05-21

## 2025-05-16 NOTE — PATIENT INSTRUCTIONS
Get CXR anytime next week and we will notify you with results.     Call your GI provider for follow up appointment.     We have an order in the system for a high resolution CT scan of your chest. This order expires in December. Results of this may help us explain the cough. You can get your CT of the chest done at [facility] locations   [facility]) Rad Department Scheduling  [phone]  Locations:  [facility], [facility] or [facility]    When your CT has been done, we recommend you call us if you haven't heard about your results within a week.

## 2025-05-16 NOTE — PROGRESS NOTES
Name:  Poonam Luciano  YOB: 1950   MRN: [id number]      Office Visit: 2025        ASSESSMENT AND PLAN:  (Medical Decision Making)    Impression: 75 y.o. female here to follow up on recurrent pleural effusion on right.  She has a history of portal hypertension, [facility], and HFrEF with recovered EF (65-70%) after stent placement to LAD.    1. Recurrent pleural effusion on right  POC ultrasound done today in the office shows large right pleural effusion as it has on multiple ultrasounds in the past.  She is not in any respiratory distress, able to lie flat, not hypoxic.  She agrees to have chest x-ray done next week.  Will review the result and discuss repeat thoracentesis.  If she has another thoracentesis this will be the sixth time.  Patient may need more invasive procedure to prevent recurrence of fluid.  Will discuss with procedural physician next week.  Strongly encouraged patient to call our office or present to the ER with respiratory distress, oxygen saturations less than 90% on room air, chest pain    - XR CHEST PA LAT (2 VIEWS); Future    2. ILD (interstitial lung disease) (HCC)  3. Chronic cough  Encouraged her to follow through with high-resolution CT that is ordered and  December.  Especially since her cough has persist now despite thoracentesis and has an isolated diffusion capacity PFT, imaging would be helpful in further diagnosis and treatment.  Will give 5-day course of Hycodan for cough suppression at night.  Encouraged her not to take Hycodan with the hydrocodone tablets that she has for fracture pain.    - XR CHEST PA LAT (2 VIEWS); Future  - HYDROcodone homatropine (HYCODAN) 5-1.5 MG/5ML solution; Take 5 mLs by mouth every 4 hours as needed (cough) for up to 5 days. Max Daily Amount: 30 mLs  Dispense: 150 mL; Refill: 0    4. HFrEF (heart failure with reduced ejection fraction) (HCC)  EF recovered on last echo .  EF now 65 or 75%

## 2025-05-21 ENCOUNTER — HOSPITAL ENCOUNTER (OUTPATIENT)
Dept: CARDIAC REHAB | Age: 75
Setting detail: RECURRING SERIES
Discharge: HOME | End: 2025-05-24

## 2025-06-03 ENCOUNTER — RESULTS FOLLOW-UP (OUTPATIENT)
Dept: PULMONOLOGY | Age: 75
End: 2025-06-03

## 2025-06-18 ENCOUNTER — TELEPHONE (OUTPATIENT)
Dept: CARDIAC REHAB | Age: 75
End: 2025-06-18

## 2025-06-18 NOTE — TELEPHONE ENCOUNTER
Received phone call from patient regarding cardiac rehab. Has had cataract surgery and waiting for vision to recover. Also continues to do PT for left arm post fracture.

## 2025-07-09 ENCOUNTER — OFFICE VISIT (OUTPATIENT)
Age: 75
End: 2025-07-09
Payer: MEDICARE

## 2025-07-09 VITALS
DIASTOLIC BLOOD PRESSURE: 78 MMHG | HEIGHT: 59 IN | HEART RATE: 70 BPM | WEIGHT: 144 LBS | BODY MASS INDEX: 29.03 KG/M2 | SYSTOLIC BLOOD PRESSURE: 122 MMHG

## 2025-07-09 DIAGNOSIS — I10 PRIMARY HYPERTENSION: ICD-10-CM

## 2025-07-09 DIAGNOSIS — I25.10 CAD S/P PERCUTANEOUS CORONARY ANGIOPLASTY: Primary | ICD-10-CM

## 2025-07-09 DIAGNOSIS — Z98.61 CAD S/P PERCUTANEOUS CORONARY ANGIOPLASTY: Primary | ICD-10-CM

## 2025-07-09 PROCEDURE — G8428 CUR MEDS NOT DOCUMENT: HCPCS | Performed by: INTERNAL MEDICINE

## 2025-07-09 PROCEDURE — 1126F AMNT PAIN NOTED NONE PRSNT: CPT | Performed by: INTERNAL MEDICINE

## 2025-07-09 PROCEDURE — 3074F SYST BP LT 130 MM HG: CPT | Performed by: INTERNAL MEDICINE

## 2025-07-09 PROCEDURE — 3017F COLORECTAL CA SCREEN DOC REV: CPT | Performed by: INTERNAL MEDICINE

## 2025-07-09 PROCEDURE — 1036F TOBACCO NON-USER: CPT | Performed by: INTERNAL MEDICINE

## 2025-07-09 PROCEDURE — 1123F ACP DISCUSS/DSCN MKR DOCD: CPT | Performed by: INTERNAL MEDICINE

## 2025-07-09 PROCEDURE — G8417 CALC BMI ABV UP PARAM F/U: HCPCS | Performed by: INTERNAL MEDICINE

## 2025-07-09 PROCEDURE — 1090F PRES/ABSN URINE INCON ASSESS: CPT | Performed by: INTERNAL MEDICINE

## 2025-07-09 PROCEDURE — 3078F DIAST BP <80 MM HG: CPT | Performed by: INTERNAL MEDICINE

## 2025-07-09 PROCEDURE — G8399 PT W/DXA RESULTS DOCUMENT: HCPCS | Performed by: INTERNAL MEDICINE

## 2025-07-09 PROCEDURE — 99213 OFFICE O/P EST LOW 20 MIN: CPT | Performed by: INTERNAL MEDICINE

## 2025-07-09 ASSESSMENT — ENCOUNTER SYMPTOMS
SHORTNESS OF BREATH: 1
ABDOMINAL PAIN: 0

## 2025-07-09 NOTE — PROGRESS NOTES
Smokeless tobacco: Never   Substance Use Topics    Alcohol use: Never       ROS:    Review of Systems   Constitutional: Negative for chills, diaphoresis and fever.   HENT:  Negative for hearing loss.    Eyes:  Negative for visual disturbance.   Cardiovascular:         As per the HPI   Respiratory:  Positive for shortness of breath.    Hematologic/Lymphatic: Does not bruise/bleed easily.   Gastrointestinal:  Negative for abdominal pain.   Genitourinary:  Negative for dysuria.   Neurological:  Negative for focal weakness.   Psychiatric/Behavioral:  Negative for suicidal ideas.           PHYSICAL EXAM:   /78   Pulse 70   Ht 1.499 m (4' 11.02\")   Wt 65.3 kg (144 lb)   BMI 29.07 kg/m²      Wt Readings from Last 3 Encounters:   07/09/25 65.3 kg (144 lb)   05/16/25 61.2 kg (135 lb)   04/14/25 64.3 kg (141 lb 11.2 oz)     BP Readings from Last 3 Encounters:   07/09/25 122/78   05/16/25 (!) 118/46   05/06/25 (!) 183/79     Pulse Readings from Last 3 Encounters:   07/09/25 70   05/16/25 (!) 48   05/06/25 69           Physical Exam  Vitals reviewed.   Constitutional:       Appearance: Normal appearance.      Comments: Appears younger than stated age   HENT:      Head: Normocephalic and atraumatic.   Eyes:      General: No scleral icterus.  Neck:      Vascular: No carotid bruit.   Cardiovascular:      Rate and Rhythm: Normal rate and regular rhythm.      Heart sounds: No murmur heard.     No gallop.   Pulmonary:      Breath sounds: Normal breath sounds.   Abdominal:      Palpations: Abdomen is soft.   Musculoskeletal:         General: No swelling.      Cervical back: Neck supple.   Skin:     General: Skin is warm and dry.   Neurological:      Mental Status: She is alert and oriented to person, place, and time.   Psychiatric:         Mood and Affect: Mood normal.         Medical problems and test results were reviewed with the patient today.     DATA REVIEW    LIPID PANEL     Lab Results   Component Value Date    CHOL

## 2025-07-17 ENCOUNTER — TELEPHONE (OUTPATIENT)
Dept: CARDIAC REHAB | Age: 75
End: 2025-07-17

## 2025-07-17 NOTE — TELEPHONE ENCOUNTER
Spoke with patient regarding attendance at cardiac rehab. Is still recovering from broken arm. Sees orthopedic doctor on 7/23 and having PT until 8/11. Also recovering from cataract surgery and struggling with vision. Will plan to follow up with her next month.

## 2025-07-29 ENCOUNTER — OFFICE VISIT (OUTPATIENT)
Dept: RHEUMATOLOGY | Age: 75
End: 2025-07-29
Payer: MEDICARE

## 2025-07-29 VITALS
DIASTOLIC BLOOD PRESSURE: 76 MMHG | WEIGHT: 142.8 LBS | OXYGEN SATURATION: 97 % | SYSTOLIC BLOOD PRESSURE: 128 MMHG | HEIGHT: 59 IN | BODY MASS INDEX: 28.79 KG/M2 | HEART RATE: 67 BPM

## 2025-07-29 DIAGNOSIS — J84.9 ILD (INTERSTITIAL LUNG DISEASE) (HCC): ICD-10-CM

## 2025-07-29 DIAGNOSIS — R76.8 POSITIVE ANA (ANTINUCLEAR ANTIBODY): Primary | ICD-10-CM

## 2025-07-29 PROCEDURE — G8427 DOCREV CUR MEDS BY ELIG CLIN: HCPCS | Performed by: INTERNAL MEDICINE

## 2025-07-29 PROCEDURE — 1159F MED LIST DOCD IN RCRD: CPT | Performed by: INTERNAL MEDICINE

## 2025-07-29 PROCEDURE — G8399 PT W/DXA RESULTS DOCUMENT: HCPCS | Performed by: INTERNAL MEDICINE

## 2025-07-29 PROCEDURE — 1036F TOBACCO NON-USER: CPT | Performed by: INTERNAL MEDICINE

## 2025-07-29 PROCEDURE — G8417 CALC BMI ABV UP PARAM F/U: HCPCS | Performed by: INTERNAL MEDICINE

## 2025-07-29 PROCEDURE — G2211 COMPLEX E/M VISIT ADD ON: HCPCS | Performed by: INTERNAL MEDICINE

## 2025-07-29 PROCEDURE — 3078F DIAST BP <80 MM HG: CPT | Performed by: INTERNAL MEDICINE

## 2025-07-29 PROCEDURE — 1160F RVW MEDS BY RX/DR IN RCRD: CPT | Performed by: INTERNAL MEDICINE

## 2025-07-29 PROCEDURE — 3017F COLORECTAL CA SCREEN DOC REV: CPT | Performed by: INTERNAL MEDICINE

## 2025-07-29 PROCEDURE — 1123F ACP DISCUSS/DSCN MKR DOCD: CPT | Performed by: INTERNAL MEDICINE

## 2025-07-29 PROCEDURE — 99214 OFFICE O/P EST MOD 30 MIN: CPT | Performed by: INTERNAL MEDICINE

## 2025-07-29 PROCEDURE — 1090F PRES/ABSN URINE INCON ASSESS: CPT | Performed by: INTERNAL MEDICINE

## 2025-07-29 PROCEDURE — 3074F SYST BP LT 130 MM HG: CPT | Performed by: INTERNAL MEDICINE

## 2025-07-29 RX ORDER — ACETAMINOPHEN 500 MG
500 TABLET ORAL EVERY 6 HOURS PRN
COMMUNITY

## 2025-07-29 ASSESSMENT — ROUTINE ASSESSMENT OF PATIENT INDEX DATA (RAPID3)
ON A SCALE OF ONE TO TEN, HOW DIFFICULT WAS IT FOR YOU TO COMPLETE THE LISTED DAILY PHYSICAL TASKS OVER THE LAST WEEK: 0.6
ON A SCALE OF ONE TO TEN, HOW MUCH PAIN HAVE YOU HAD BECAUSE OF YOUR CONDITION OVER THE PAST WEEK?: 1
ON A SCALE OF ONE TO TEN, HOW MUCH OF A PROBLEM HAS UNUSUAL FATIGUE OR TIREDNESS BEEN FOR YOU OVER THE PAST WEEK?: 1
ON A SCALE OF ONE TO TEN, CONSIDERING ALL THE WAYS IN WHICH ILLNESS AND HEALTH CONDITIONS MAY AFFECT YOU AT THIS TIME, PLEASE INDICATE BELOW HOW YOU ARE DOING:: 1

## 2025-07-29 ASSESSMENT — JOINT PAIN
TOTAL NUMBER OF SWOLLEN JOINTS: 0
TOTAL NUMBER OF TENDER JOINTS: 2

## 2025-07-29 NOTE — PROGRESS NOTES
secondary to an autoimmune condition.    ILD (interstitial lung disease) (HCC): I did instruct her to continue to follow-up with the pulmonologist for now, who has ordered the high-resolution chest CT, so that I can review those results with her on her follow-up visit with me.    Disease activity plan:  As stated above.    Steroid management plan:  As stated above, if applicable.    Pain management plan:  As stated above, if applicable.    Weight management plan:  Weight loss through diet and exercise is always encouraged    Disease prognosis: Good    I appreciate the opportunity to continue to participate in the care of this patient.     Follow-up and Dispositions    Return in about 9 months (around 4/29/2026).       Electronically signed by:  Amanda Trevino MD      This note was dictated using dragon voice recognition software.  It has been proofread, but there may still exist voice recognition errors that the author did not detect.                --------------------------------------------------------------------------------------------------------------------------------------------------------------------------------------------------------------------------------

## 2025-08-22 ENCOUNTER — OFFICE VISIT (OUTPATIENT)
Dept: PULMONOLOGY | Age: 75
End: 2025-08-22
Payer: MEDICARE

## 2025-08-22 VITALS
RESPIRATION RATE: 18 BRPM | BODY MASS INDEX: 29.03 KG/M2 | WEIGHT: 144 LBS | OXYGEN SATURATION: 97 % | HEIGHT: 59 IN | DIASTOLIC BLOOD PRESSURE: 98 MMHG | HEART RATE: 92 BPM | TEMPERATURE: 97.2 F | SYSTOLIC BLOOD PRESSURE: 149 MMHG

## 2025-08-22 DIAGNOSIS — K76.6 PORTAL HYPERTENSION (HCC): ICD-10-CM

## 2025-08-22 DIAGNOSIS — J90 RECURRENT PLEURAL EFFUSION ON RIGHT: Primary | ICD-10-CM

## 2025-08-22 DIAGNOSIS — R05.3 CHRONIC COUGH: ICD-10-CM

## 2025-08-22 PROCEDURE — 1090F PRES/ABSN URINE INCON ASSESS: CPT | Performed by: STUDENT IN AN ORGANIZED HEALTH CARE EDUCATION/TRAINING PROGRAM

## 2025-08-22 PROCEDURE — G8417 CALC BMI ABV UP PARAM F/U: HCPCS | Performed by: STUDENT IN AN ORGANIZED HEALTH CARE EDUCATION/TRAINING PROGRAM

## 2025-08-22 PROCEDURE — 1125F AMNT PAIN NOTED PAIN PRSNT: CPT | Performed by: STUDENT IN AN ORGANIZED HEALTH CARE EDUCATION/TRAINING PROGRAM

## 2025-08-22 PROCEDURE — 1159F MED LIST DOCD IN RCRD: CPT | Performed by: STUDENT IN AN ORGANIZED HEALTH CARE EDUCATION/TRAINING PROGRAM

## 2025-08-22 PROCEDURE — G8399 PT W/DXA RESULTS DOCUMENT: HCPCS | Performed by: STUDENT IN AN ORGANIZED HEALTH CARE EDUCATION/TRAINING PROGRAM

## 2025-08-22 PROCEDURE — 3080F DIAST BP >= 90 MM HG: CPT | Performed by: STUDENT IN AN ORGANIZED HEALTH CARE EDUCATION/TRAINING PROGRAM

## 2025-08-22 PROCEDURE — G8427 DOCREV CUR MEDS BY ELIG CLIN: HCPCS | Performed by: STUDENT IN AN ORGANIZED HEALTH CARE EDUCATION/TRAINING PROGRAM

## 2025-08-22 PROCEDURE — 3017F COLORECTAL CA SCREEN DOC REV: CPT | Performed by: STUDENT IN AN ORGANIZED HEALTH CARE EDUCATION/TRAINING PROGRAM

## 2025-08-22 PROCEDURE — 99214 OFFICE O/P EST MOD 30 MIN: CPT | Performed by: STUDENT IN AN ORGANIZED HEALTH CARE EDUCATION/TRAINING PROGRAM

## 2025-08-22 PROCEDURE — 1036F TOBACCO NON-USER: CPT | Performed by: STUDENT IN AN ORGANIZED HEALTH CARE EDUCATION/TRAINING PROGRAM

## 2025-08-22 PROCEDURE — 3077F SYST BP >= 140 MM HG: CPT | Performed by: STUDENT IN AN ORGANIZED HEALTH CARE EDUCATION/TRAINING PROGRAM

## 2025-08-22 PROCEDURE — 1123F ACP DISCUSS/DSCN MKR DOCD: CPT | Performed by: STUDENT IN AN ORGANIZED HEALTH CARE EDUCATION/TRAINING PROGRAM

## 2025-09-02 ENCOUNTER — TELEPHONE (OUTPATIENT)
Dept: CARDIAC REHAB | Age: 75
End: 2025-09-02

## 2025-09-03 ENCOUNTER — TELEPHONE (OUTPATIENT)
Age: 75
End: 2025-09-03

## (undated) DEVICE — KIT THORCENT 8FR L5IN POLYUR W/ 18/22/25GA NDL 3 W STPCOCK

## (undated) DEVICE — BAND COMPR L24CM REG CLR PLAS HEMSTAT EXT HK AND LOOP RETEN

## (undated) DEVICE — STERILE POLYISOPRENE POWDER-FREE SURGICAL GLOVES: Brand: PROTEXIS

## (undated) DEVICE — CATHETER COR DIAG PIGTAILS PIG 145 CRV 5FR 110CM 6 SIDE H

## (undated) DEVICE — CATH BLLN ANGIO 2.75X15MM NC EUPHORIA RX

## (undated) DEVICE — GLIDESHEATH SLENDER STAINLESS STEEL KIT: Brand: GLIDESHEATH SLENDER

## (undated) DEVICE — GUIDEWIRE 035IN 210CM PTFE COAT FIX COR J TIP 15MM FIRM BODY

## (undated) DEVICE — RADIFOCUS OPTITORQUE ANGIOGRAPHIC CATHETER: Brand: OPTITORQUE

## (undated) DEVICE — HI-TORQUE WHISPER MS GUIDE WIRE .014 STRAIGHT TIP 3.0 CM X 190 CM: Brand: HI-TORQUE WHISPER

## (undated) DEVICE — Device: Brand: PROWATER

## (undated) DEVICE — CATH BLLN ANGIO 2.25X15MM SC EUPHORA RX

## (undated) DEVICE — CATHETER GUID 6FR L100CM DIA0.071IN NYL SHFT EBU3.0 W/O